# Patient Record
Sex: MALE
[De-identification: names, ages, dates, MRNs, and addresses within clinical notes are randomized per-mention and may not be internally consistent; named-entity substitution may affect disease eponyms.]

---

## 2017-02-23 PROCEDURE — 84154 ASSAY OF PSA FREE: CPT | Performed by: UROLOGY

## 2017-02-23 PROCEDURE — 36415 COLL VENOUS BLD VENIPUNCTURE: CPT | Performed by: UROLOGY

## 2017-02-23 PROCEDURE — 84153 ASSAY OF PSA TOTAL: CPT | Performed by: UROLOGY

## 2017-04-12 PROCEDURE — 87086 URINE CULTURE/COLONY COUNT: CPT | Performed by: UROLOGY

## 2017-04-17 PROCEDURE — 88344 IMHCHEM/IMCYTCHM EA MLT ANTB: CPT | Performed by: UROLOGY

## 2017-04-17 PROCEDURE — 88321 CONSLTJ&REPRT SLD PREP ELSWR: CPT | Performed by: UROLOGY

## 2017-04-17 PROCEDURE — 88305 TISSUE EXAM BY PATHOLOGIST: CPT | Performed by: UROLOGY

## 2018-02-21 PROBLEM — L28.0 LICHEN SIMPLEX CHRONICUS: Status: ACTIVE | Noted: 2018-02-21

## 2018-02-21 PROBLEM — L81.0 POSTINFLAMMATORY HYPERPIGMENTATION: Status: ACTIVE | Noted: 2018-02-21

## 2018-02-21 PROBLEM — L85.3 XEROSIS CUTIS: Status: ACTIVE | Noted: 2018-02-21

## 2018-04-17 PROCEDURE — 84154 ASSAY OF PSA FREE: CPT | Performed by: UROLOGY

## 2018-04-17 PROCEDURE — 36415 COLL VENOUS BLD VENIPUNCTURE: CPT | Performed by: UROLOGY

## 2018-04-17 PROCEDURE — 84153 ASSAY OF PSA TOTAL: CPT | Performed by: UROLOGY

## 2018-06-27 PROCEDURE — 87086 URINE CULTURE/COLONY COUNT: CPT | Performed by: INTERNAL MEDICINE

## 2018-06-27 PROCEDURE — 81001 URINALYSIS AUTO W/SCOPE: CPT | Performed by: INTERNAL MEDICINE

## 2018-08-20 PROBLEM — Z85.46 HISTORY OF PROSTATE CANCER: Status: ACTIVE | Noted: 2018-08-20

## 2018-09-05 PROBLEM — R30.0 DYSURIA: Status: ACTIVE | Noted: 2018-09-05

## 2018-09-25 ENCOUNTER — DIAGNOSTIC TRANS (OUTPATIENT)
Dept: OTHER | Age: 73
End: 2018-09-25

## 2018-09-25 ENCOUNTER — HOSPITAL (OUTPATIENT)
Dept: OTHER | Age: 73
End: 2018-09-25
Attending: EMERGENCY MEDICINE

## 2018-09-25 LAB
ALBUMIN SERPL-MCNC: 3.3 GM/DL (ref 3.6–5.1)
ALBUMIN/GLOB SERPL: 0.9 {RATIO} (ref 1–2.4)
ALP SERPL-CCNC: 62 UNIT/L (ref 45–117)
ALT SERPL-CCNC: 27 UNIT/L
ANALYZER ANC (IANC): ABNORMAL
ANION GAP SERPL CALC-SCNC: 12 MMOL/L (ref 10–20)
AST SERPL-CCNC: 19 UNIT/L
BASOPHILS # BLD: 0 THOUSAND/MCL (ref 0–0.3)
BASOPHILS NFR BLD: 0 %
BILIRUB SERPL-MCNC: 0.4 MG/DL (ref 0.2–1)
BUN SERPL-MCNC: 31 MG/DL (ref 6–20)
BUN/CREAT SERPL: 19 (ref 7–25)
CALCIUM SERPL-MCNC: 9.2 MG/DL (ref 8.4–10.2)
CHLORIDE: 105 MMOL/L (ref 98–107)
CO2 SERPL-SCNC: 28 MMOL/L (ref 21–32)
CREAT SERPL-MCNC: 1.63 MG/DL (ref 0.67–1.17)
DIFFERENTIAL METHOD BLD: ABNORMAL
EOSINOPHIL # BLD: 0.2 THOUSAND/MCL (ref 0.1–0.5)
EOSINOPHIL NFR BLD: 2 %
ERYTHROCYTE [DISTWIDTH] IN BLOOD: 12.9 % (ref 11–15)
GLOBULIN SER-MCNC: 3.5 GM/DL (ref 2–4)
GLUCOSE SERPL-MCNC: 119 MG/DL (ref 65–99)
HEMATOCRIT: 38.3 % (ref 39–51)
HGB BLD-MCNC: 12.8 GM/DL (ref 13–17)
LIPASE SERPL-CCNC: 157 UNIT/L (ref 73–393)
LYMPHOCYTES # BLD: 0.7 THOUSAND/MCL (ref 1–4)
LYMPHOCYTES NFR BLD: 10 %
MCH RBC QN AUTO: 30.8 PG (ref 26–34)
MCHC RBC AUTO-ENTMCNC: 33.4 GM/DL (ref 32–36.5)
MCV RBC AUTO: 92.3 FL (ref 78–100)
MONOCYTES # BLD: 0.5 THOUSAND/MCL (ref 0.3–0.9)
MONOCYTES NFR BLD: 7 %
NEUTROPHILS # BLD: 5.5 THOUSAND/MCL (ref 1.8–7.7)
NEUTROPHILS NFR BLD: 81 %
NEUTS SEG NFR BLD: ABNORMAL %
NRBC (NRBCRE): ABNORMAL
PLATELET # BLD: 244 THOUSAND/MCL (ref 140–450)
POTASSIUM SERPL-SCNC: 3.5 MMOL/L (ref 3.4–5.1)
PROT SERPL-MCNC: 6.8 GM/DL (ref 6.4–8.2)
RBC # BLD: 4.15 MILLION/MCL (ref 4.5–5.9)
SODIUM SERPL-SCNC: 141 MMOL/L (ref 135–145)
TROPONIN I SERPL HS-MCNC: <0.02 NG/ML
WBC # BLD: 6.8 THOUSAND/MCL (ref 4.2–11)

## 2018-10-01 PROCEDURE — 84154 ASSAY OF PSA FREE: CPT | Performed by: UROLOGY

## 2018-10-01 PROCEDURE — 84153 ASSAY OF PSA TOTAL: CPT | Performed by: UROLOGY

## 2018-10-04 PROBLEM — M75.42 IMPINGEMENT SYNDROME OF LEFT SHOULDER: Status: ACTIVE | Noted: 2018-10-04

## 2018-11-21 PROCEDURE — 82365 CALCULUS SPECTROSCOPY: CPT | Performed by: UROLOGY

## 2018-12-04 PROCEDURE — 83945 ASSAY OF OXALATE: CPT | Performed by: UROLOGY

## 2018-12-04 PROCEDURE — 82436 ASSAY OF URINE CHLORIDE: CPT | Performed by: UROLOGY

## 2018-12-04 PROCEDURE — 82507 ASSAY OF CITRATE: CPT | Performed by: UROLOGY

## 2018-12-04 PROCEDURE — 82340 ASSAY OF CALCIUM IN URINE: CPT | Performed by: UROLOGY

## 2018-12-04 PROCEDURE — 84392 ASSAY OF URINE SULFATE: CPT | Performed by: UROLOGY

## 2021-08-11 NOTE — H&P (VIEW-ONLY)
Celina Puckett is a 77year old male who presents for a pre-operative physical exam. Patient is to have surgery on his R facial gland, to be done by Dr. Ramya Joaquin at [facility] on 8/31.       HPI:   Pt complains of ongoing issues with his R parotid g LtM-gl7      Past Surgical History:   Procedure Laterality Date   • CHOLECYSTECTOMY     • COLONOSCOPY      05   • COLONOSCOPY      2015 repeat 10 years     • COLONOSCOPY,BIOPSY N/A 9/9/2015    Procedure: COLONOSCOPY, POSSIBLE BIOPSY, POSSIBLE POLYPECTOMY 4 tenderness  LUNGS: clear to auscultation  CARDIO: RRR without murmur  GI: good BS's,no masses, HSM or tenderness  : declined  RECTAL: declined  MUSCULOSKELETAL: back is not tender,FROM of the back  EXTREMITIES: no cyanosis, clubbing or edema  NEURO: Joann Amor

## 2021-08-25 RX ORDER — CEFAZOLIN SODIUM/WATER 2 G/20 ML
2 SYRINGE (ML) INTRAVENOUS ONCE
Status: CANCELLED | OUTPATIENT
Start: 2021-08-25 | End: 2021-08-25

## 2021-08-25 RX ORDER — ACETAMINOPHEN 500 MG
1000 TABLET ORAL ONCE
Status: CANCELLED | OUTPATIENT
Start: 2021-08-25 | End: 2021-08-25

## 2021-08-25 RX ORDER — DIPHENHYDRAMINE HCL 25 MG
25 TABLET ORAL NIGHTLY PRN
COMMUNITY

## 2021-08-26 ENCOUNTER — ANESTHESIA EVENT (OUTPATIENT)
Dept: SURGERY | Facility: HOSPITAL | Age: 76
End: 2021-08-26
Payer: COMMERCIAL

## 2021-08-28 ENCOUNTER — LAB ENCOUNTER (OUTPATIENT)
Dept: LAB | Age: 76
End: 2021-08-28
Attending: OTOLARYNGOLOGY
Payer: COMMERCIAL

## 2021-08-28 DIAGNOSIS — K11.5 PAROTID SIALOLITHIASIS: ICD-10-CM

## 2021-08-30 LAB — SARS-COV-2 RNA RESP QL NAA+PROBE: NOT DETECTED

## 2021-08-31 ENCOUNTER — HOSPITAL ENCOUNTER (OUTPATIENT)
Facility: HOSPITAL | Age: 76
Discharge: HOME | End: 2021-09-01
Attending: OTOLARYNGOLOGY | Admitting: OTOLARYNGOLOGY
Payer: COMMERCIAL

## 2021-08-31 ENCOUNTER — ANESTHESIA (OUTPATIENT)
Dept: SURGERY | Facility: HOSPITAL | Age: 76
End: 2021-08-31
Payer: COMMERCIAL

## 2021-08-31 DIAGNOSIS — K11.5 PAROTID SIALOLITHIASIS: Primary | ICD-10-CM

## 2021-08-31 PROCEDURE — 0C9800Z DRAINAGE OF RIGHT PAROTID GLAND WITH DRAINAGE DEVICE, OPEN APPROACH: ICD-10-PCS | Performed by: OTOLARYNGOLOGY

## 2021-08-31 RX ORDER — HYDROCODONE BITARTRATE AND ACETAMINOPHEN 5; 325 MG/1; MG/1
2 TABLET ORAL AS NEEDED
Status: COMPLETED | OUTPATIENT
Start: 2021-08-31 | End: 2021-08-31

## 2021-08-31 RX ORDER — HYDROCODONE BITARTRATE AND ACETAMINOPHEN 5; 325 MG/1; MG/1
2 TABLET ORAL EVERY 4 HOURS PRN
Status: DISCONTINUED | OUTPATIENT
Start: 2021-08-31 | End: 2021-09-01

## 2021-08-31 RX ORDER — AMLODIPINE AND OLMESARTAN MEDOXOMIL 10; 20 MG/1; MG/1
1 TABLET ORAL DAILY
COMMUNITY
End: 2021-11-11

## 2021-08-31 RX ORDER — HYDROCODONE BITARTRATE AND ACETAMINOPHEN 5; 325 MG/1; MG/1
1 TABLET ORAL AS NEEDED
Status: COMPLETED | OUTPATIENT
Start: 2021-08-31 | End: 2021-08-31

## 2021-08-31 RX ORDER — MEPERIDINE HYDROCHLORIDE 25 MG/ML
12.5 INJECTION INTRAMUSCULAR; INTRAVENOUS; SUBCUTANEOUS AS NEEDED
Status: DISCONTINUED | OUTPATIENT
Start: 2021-08-31 | End: 2021-08-31 | Stop reason: HOSPADM

## 2021-08-31 RX ORDER — AMLODIPINE AND OLMESARTAN MEDOXOMIL 10; 20 MG/1; MG/1
0.5 TABLET ORAL DAILY
Status: DISCONTINUED | OUTPATIENT
Start: 2021-09-01 | End: 2021-08-31 | Stop reason: SDUPTHER

## 2021-08-31 RX ORDER — AMLODIPINE BESYLATE 5 MG/1
5 TABLET ORAL DAILY
Status: DISCONTINUED | OUTPATIENT
Start: 2021-09-01 | End: 2021-09-01

## 2021-08-31 RX ORDER — DEXAMETHASONE SODIUM PHOSPHATE 4 MG/ML
VIAL (ML) INJECTION AS NEEDED
Status: DISCONTINUED | OUTPATIENT
Start: 2021-08-31 | End: 2021-08-31 | Stop reason: SURG

## 2021-08-31 RX ORDER — TAMSULOSIN HYDROCHLORIDE 0.4 MG/1
0.4 CAPSULE ORAL DAILY
COMMUNITY
End: 2021-11-11

## 2021-08-31 RX ORDER — LIDOCAINE HYDROCHLORIDE 10 MG/ML
INJECTION, SOLUTION EPIDURAL; INFILTRATION; INTRACAUDAL; PERINEURAL AS NEEDED
Status: DISCONTINUED | OUTPATIENT
Start: 2021-08-31 | End: 2021-08-31 | Stop reason: SURG

## 2021-08-31 RX ORDER — ONDANSETRON 2 MG/ML
INJECTION INTRAMUSCULAR; INTRAVENOUS AS NEEDED
Status: DISCONTINUED | OUTPATIENT
Start: 2021-08-31 | End: 2021-08-31 | Stop reason: SURG

## 2021-08-31 RX ORDER — ONDANSETRON 2 MG/ML
4 INJECTION INTRAMUSCULAR; INTRAVENOUS EVERY 6 HOURS PRN
Status: DISCONTINUED | OUTPATIENT
Start: 2021-08-31 | End: 2021-09-01

## 2021-08-31 RX ORDER — LIDOCAINE HYDROCHLORIDE AND EPINEPHRINE 10; 10 MG/ML; UG/ML
INJECTION, SOLUTION INFILTRATION; PERINEURAL AS NEEDED
Status: DISCONTINUED | OUTPATIENT
Start: 2021-08-31 | End: 2021-08-31 | Stop reason: HOSPADM

## 2021-08-31 RX ORDER — NALOXONE HYDROCHLORIDE 0.4 MG/ML
80 INJECTION, SOLUTION INTRAMUSCULAR; INTRAVENOUS; SUBCUTANEOUS AS NEEDED
Status: DISCONTINUED | OUTPATIENT
Start: 2021-08-31 | End: 2021-08-31 | Stop reason: HOSPADM

## 2021-08-31 RX ORDER — TAMSULOSIN HYDROCHLORIDE 0.4 MG/1
0.4 CAPSULE ORAL DAILY
Status: DISCONTINUED | OUTPATIENT
Start: 2021-09-01 | End: 2021-09-01

## 2021-08-31 RX ORDER — AMOXICILLIN AND CLAVULANATE POTASSIUM 875; 125 MG/1; MG/1
1 TABLET, FILM COATED ORAL 2 TIMES DAILY
Qty: 28 TABLET | Refills: 0 | Status: SHIPPED | OUTPATIENT
Start: 2021-08-31 | End: 2021-09-15

## 2021-08-31 RX ORDER — LOSARTAN POTASSIUM 25 MG/1
25 TABLET ORAL DAILY
Status: DISCONTINUED | OUTPATIENT
Start: 2021-09-01 | End: 2021-09-01

## 2021-08-31 RX ORDER — SODIUM CHLORIDE, SODIUM LACTATE, POTASSIUM CHLORIDE, CALCIUM CHLORIDE 600; 310; 30; 20 MG/100ML; MG/100ML; MG/100ML; MG/100ML
INJECTION, SOLUTION INTRAVENOUS CONTINUOUS
Status: DISCONTINUED | OUTPATIENT
Start: 2021-08-31 | End: 2021-09-01

## 2021-08-31 RX ORDER — PREDNISONE 10 MG/1
30 TABLET ORAL DAILY
Status: DISCONTINUED | OUTPATIENT
Start: 2021-08-31 | End: 2021-09-01

## 2021-08-31 RX ORDER — HYDROCODONE BITARTRATE AND ACETAMINOPHEN 5; 325 MG/1; MG/1
1 TABLET ORAL EVERY 4 HOURS PRN
Status: DISCONTINUED | OUTPATIENT
Start: 2021-08-31 | End: 2021-09-01

## 2021-08-31 RX ORDER — MIDAZOLAM HYDROCHLORIDE 1 MG/ML
1 INJECTION INTRAMUSCULAR; INTRAVENOUS EVERY 5 MIN PRN
Status: DISCONTINUED | OUTPATIENT
Start: 2021-08-31 | End: 2021-08-31 | Stop reason: HOSPADM

## 2021-08-31 RX ORDER — ONDANSETRON 2 MG/ML
4 INJECTION INTRAMUSCULAR; INTRAVENOUS AS NEEDED
Status: DISCONTINUED | OUTPATIENT
Start: 2021-08-31 | End: 2021-08-31 | Stop reason: HOSPADM

## 2021-08-31 RX ORDER — DIPHENHYDRAMINE HCL 25 MG
25 CAPSULE ORAL NIGHTLY PRN
Status: DISCONTINUED | OUTPATIENT
Start: 2021-08-31 | End: 2021-09-01

## 2021-08-31 RX ORDER — AMOXICILLIN AND CLAVULANATE POTASSIUM 875; 125 MG/1; MG/1
875 TABLET, FILM COATED ORAL EVERY 12 HOURS SCHEDULED
Status: DISCONTINUED | OUTPATIENT
Start: 2021-08-31 | End: 2021-09-01

## 2021-08-31 RX ORDER — CEFAZOLIN SODIUM 1 G/3ML
INJECTION, POWDER, FOR SOLUTION INTRAMUSCULAR; INTRAVENOUS AS NEEDED
Status: DISCONTINUED | OUTPATIENT
Start: 2021-08-31 | End: 2021-08-31 | Stop reason: SURG

## 2021-08-31 RX ORDER — HYDROMORPHONE HYDROCHLORIDE 1 MG/ML
INJECTION, SOLUTION INTRAMUSCULAR; INTRAVENOUS; SUBCUTANEOUS
Status: COMPLETED
Start: 2021-08-31 | End: 2021-08-31

## 2021-08-31 RX ORDER — METOCLOPRAMIDE HYDROCHLORIDE 5 MG/ML
10 INJECTION INTRAMUSCULAR; INTRAVENOUS AS NEEDED
Status: DISCONTINUED | OUTPATIENT
Start: 2021-08-31 | End: 2021-08-31 | Stop reason: HOSPADM

## 2021-08-31 RX ORDER — METOPROLOL SUCCINATE 100 MG/1
100 TABLET, EXTENDED RELEASE ORAL DAILY
Status: DISCONTINUED | OUTPATIENT
Start: 2021-09-01 | End: 2021-09-01

## 2021-08-31 RX ORDER — CLONIDINE 0.2 MG/24H
1 PATCH, EXTENDED RELEASE TRANSDERMAL WEEKLY
Status: DISCONTINUED | OUTPATIENT
Start: 2021-09-01 | End: 2021-09-01

## 2021-08-31 RX ORDER — PREDNISONE 10 MG/1
30 TABLET ORAL DAILY
Qty: 21 TABLET | Refills: 0 | Status: SHIPPED | OUTPATIENT
Start: 2021-08-31 | End: 2021-09-07

## 2021-08-31 RX ORDER — HYDROMORPHONE HYDROCHLORIDE 1 MG/ML
0.4 INJECTION, SOLUTION INTRAMUSCULAR; INTRAVENOUS; SUBCUTANEOUS EVERY 5 MIN PRN
Status: DISCONTINUED | OUTPATIENT
Start: 2021-08-31 | End: 2021-08-31 | Stop reason: HOSPADM

## 2021-08-31 RX ORDER — HYDROCODONE BITARTRATE AND ACETAMINOPHEN 5; 325 MG/1; MG/1
1-2 TABLET ORAL EVERY 4 HOURS PRN
Status: DISCONTINUED | OUTPATIENT
Start: 2021-08-31 | End: 2021-08-31 | Stop reason: SDUPTHER

## 2021-08-31 RX ADMIN — LIDOCAINE HYDROCHLORIDE 50 MG: 10 INJECTION, SOLUTION EPIDURAL; INFILTRATION; INTRACAUDAL; PERINEURAL at 12:18:00

## 2021-08-31 RX ADMIN — DEXAMETHASONE SODIUM PHOSPHATE 8 MG: 4 MG/ML VIAL (ML) INJECTION at 12:43:00

## 2021-08-31 RX ADMIN — SODIUM CHLORIDE, SODIUM LACTATE, POTASSIUM CHLORIDE, CALCIUM CHLORIDE: 600; 310; 30; 20 INJECTION, SOLUTION INTRAVENOUS at 12:14:00

## 2021-08-31 RX ADMIN — CEFAZOLIN SODIUM 2 G: 1 INJECTION, POWDER, FOR SOLUTION INTRAMUSCULAR; INTRAVENOUS at 12:28:00

## 2021-08-31 RX ADMIN — SODIUM CHLORIDE, SODIUM LACTATE, POTASSIUM CHLORIDE, CALCIUM CHLORIDE: 600; 310; 30; 20 INJECTION, SOLUTION INTRAVENOUS at 14:12:00

## 2021-08-31 RX ADMIN — ONDANSETRON 4 MG: 2 INJECTION INTRAMUSCULAR; INTRAVENOUS at 12:44:00

## 2021-08-31 NOTE — ANESTHESIA PROCEDURE NOTES
Airway  Date/Time: 8/31/2021 12:20 PM  Urgency: elective      General Information and Staff    Patient location during procedure: OR  Anesthesiologist: Claudeen Hunger, MD  Performed: anesthesiologist     Indications and Patient Condition  Indications fo

## 2021-08-31 NOTE — BRIEF OP NOTE
Pre-Operative Diagnosis: Parotid sialolithiasis [K11.5]     Post-Operative Diagnosis: Parotid sialolithiasis [K11.5]      Procedure Performed:   OPEN DRAINAGE OF RIGHT PAROTID ABCESS WITH NERVE MONITOR     Surgeon(s) and Role:     Gus Fuentes MD -

## 2021-08-31 NOTE — ANESTHESIA PREPROCEDURE EVALUATION
PRE-OP EVALUATION    Patient Name: Mal Soler    Admit Diagnosis: Parotid sialolithiasis [K11.5]    Pre-op Diagnosis: Parotid sialolithiasis [K11.5]    OPEN RIGHT PAROTID STONE REMOVAL POSSIBLE PAROTIDECTOMY WITH NERVE MONITOR     Anesthesia Procedu Tobacco comment: 1985    Alcohol use: Yes      Alcohol/week: 0.0 standard drinks      Comment: 3-4 on occas      Drug use: No     Available pre-op labs reviewed.   Lab Results   Component Value Date    WBC 9.10 08/05/2021    RBC 4.42 08/05/2021    HGB 13.4

## 2021-08-31 NOTE — OPERATIVE REPORT
[facility] [facility] Patient Status:  Outpatient in a Bed    1945 MRN [id number]   [facility] 3NW-A Attending No att. providers found   [facility] Day # 0 PCP Rebecca Oliveira MD       Operative Note    Patient Name: Sarah Almaraz patient. The patient was sterilely prepped and draped for parotid surgery. Facial nerve monitoring leads were placed prior to scrubbing. The incision was mapped out with a marking pen in the fashion of a modified josephine.  The incision was injected with 1% unnecessary risk for benign disease. The patient has wife had been explained this prior to surgery and knew that this was a distinct possibility.   Once I was satisfied that I had searched as safely as possible for the stone the wound was copiously irrigat

## 2021-08-31 NOTE — ANESTHESIA POSTPROCEDURE EVALUATION
[address] [facility] Patient Status:  Outpatient in a Bed   Age/Gender 77year old male MRN [id number]   [facility] SURGERY Attending Jhonny Bee MD   Hosp Day # 0 PCP Li Cameron MD       Anesthesia Post-op Note

## 2021-08-31 NOTE — INTERVAL H&P NOTE
Pre-op Diagnosis: Parotid sialolithiasis [K11.5]    The above referenced H&P was reviewed by Keshia Butterfield MD on 8/31/2021, the patient was examined and no significant changes have occurred in the patient's condition since the H&P was performed.   I disc

## 2021-09-01 VITALS
WEIGHT: 208.56 LBS | BODY MASS INDEX: 29.86 KG/M2 | TEMPERATURE: 99 F | RESPIRATION RATE: 18 BRPM | HEIGHT: 70 IN | OXYGEN SATURATION: 95 % | SYSTOLIC BLOOD PRESSURE: 170 MMHG | HEART RATE: 78 BPM | DIASTOLIC BLOOD PRESSURE: 83 MMHG

## 2021-09-01 LAB
ANION GAP SERPL CALC-SCNC: 7 MMOL/L (ref 0–18)
BASOPHILS # BLD AUTO: 0.01 X10(3) UL (ref 0–0.2)
BASOPHILS NFR BLD AUTO: 0.1 %
BUN BLD-MCNC: 28 MG/DL (ref 7–18)
CALCIUM BLD-MCNC: 9.3 MG/DL (ref 8.5–10.1)
CHLORIDE SERPL-SCNC: 103 MMOL/L (ref 98–112)
CO2 SERPL-SCNC: 27 MMOL/L (ref 21–32)
CREAT BLD-MCNC: 1.53 MG/DL
EOSINOPHIL # BLD AUTO: 0 X10(3) UL (ref 0–0.7)
EOSINOPHIL NFR BLD AUTO: 0 %
ERYTHROCYTE [DISTWIDTH] IN BLOOD BY AUTOMATED COUNT: 12.9 %
GLUCOSE BLD-MCNC: 156 MG/DL (ref 70–99)
HCT VFR BLD AUTO: 36 %
HGB BLD-MCNC: 12.1 G/DL
IMM GRANULOCYTES # BLD AUTO: 0.09 X10(3) UL (ref 0–1)
IMM GRANULOCYTES NFR BLD: 0.7 %
LYMPHOCYTES # BLD AUTO: 0.61 X10(3) UL (ref 1–4)
LYMPHOCYTES NFR BLD AUTO: 4.7 %
MCH RBC QN AUTO: 31.4 PG (ref 26–34)
MCHC RBC AUTO-ENTMCNC: 33.6 G/DL (ref 31–37)
MCV RBC AUTO: 93.5 FL
MONOCYTES # BLD AUTO: 0.46 X10(3) UL (ref 0.1–1)
MONOCYTES NFR BLD AUTO: 3.6 %
NEUTROPHILS # BLD AUTO: 11.76 X10 (3) UL (ref 1.5–7.7)
NEUTROPHILS # BLD AUTO: 11.76 X10(3) UL (ref 1.5–7.7)
NEUTROPHILS NFR BLD AUTO: 90.9 %
OSMOLALITY SERPL CALC.SUM OF ELEC: 293 MOSM/KG (ref 275–295)
PLATELET # BLD AUTO: 262 10(3)UL (ref 150–450)
POTASSIUM SERPL-SCNC: 4.7 MMOL/L (ref 3.5–5.1)
RBC # BLD AUTO: 3.85 X10(6)UL
SODIUM SERPL-SCNC: 137 MMOL/L (ref 136–145)
WBC # BLD AUTO: 12.9 X10(3) UL (ref 4–11)

## 2021-09-01 PROCEDURE — 85025 COMPLETE CBC W/AUTO DIFF WBC: CPT | Performed by: INTERNAL MEDICINE

## 2021-09-01 PROCEDURE — 80048 BASIC METABOLIC PNL TOTAL CA: CPT | Performed by: INTERNAL MEDICINE

## 2021-09-01 NOTE — PROGRESS NOTES
[facility] Hospitalist Progress Note                                                                   [address] [facility]  6/2/1945    SUBJECTIVE:  Pt seen and examined. Doing well, off O2.   Pain c Parotid sialolithiasis       77 yr old male with PMH sig for HTN who presents s/p open drainage of R parotid abscess.     # R parotid abscess  · S/p I+D  · Post op care per ENT   · abx and steroids per ENT recs     # Essential HTN  · stable  · Resume home

## 2021-09-01 NOTE — PLAN OF CARE
Assumed care of patient at 0000. Upon assessment pt appears comfortable, rates pain/discomfort at 2/10 & declines pain meds at this time. Right side of face continues to have noted swelling.  LAUREANO drain in right of neck draining serosanguinous drainage to bul injury  Description: INTERVENTIONS AS NEEDED:  - Assess patient for physical needs  - Identify cognitive and physical deficits and behaviors that affect risk of falls  - [place] fall precautions as indicated by assessment  - Educate patient/family on pat

## 2021-09-01 NOTE — PROGRESS NOTES
NURSING ADMISSION NOTE      Patient admitted via Cart  Oriented to room. Safety precautions initiated. Bed in low position. Call light in reach. received patient from same day surgery . Alert and orient x 4 , on o2 3l/nc , c pox , o2 sat 100% .  Epworth Brim

## 2021-09-01 NOTE — CONSULTS
General Medicine Consult      Reason for consult:  Post op medical management     Consulted by: Dr. Jaleesa Dorsey    PCP: Star Merino MD      History of Present Illness: Patient is a 77year old male with PMH sig for HTN who presents s/p open drainage of R 0  diphenhydrAMINE HCl 25 MG Oral Tab, Take 25 mg by mouth nightly as needed for Sleep., Disp: , Rfl:   Metoprolol Succinate  MG Oral Tablet 24 Hr, Take 1 tablet (100 mg total) by mouth daily. , Disp: 90 tablet, Rfl: 0  cloNIDine 0.2 MG/24HR Transderm nontender, nondistended, no organomegaly, bowel sounds present  MSK: Full range of motion in extremities, no clubbing, no cyanosis  Skin: no rashes or lesions  Neuro:  Grossly intact, no sensory deficits     Diagnostics:   CBC/Chem  No results for input(s) history and medications. Further recommendations pending patient's clinical course.   [facility] hospitalist to continue to follow patient while in house    Patient and/or patient's family given opportunity to ask questions and note understanding and agreeing

## 2021-09-01 NOTE — PROGRESS NOTES
The patient declined all the antihypertensive medications that were ordered for him this morning. He states that he will take his own antihypertensive medications when he gets home. He is tolerating clear liquids with no complaint of nausea.

## 2021-09-02 NOTE — PROGRESS NOTES
Patient discharged to home. The patient received his discharge instructions and LAUREANO Drain care teaching from Leander Valdez RN. The patient was taken down to the [employer] by wheelchair by Leander Valdez RN.  He will be driven home by his

## 2021-10-12 RX ORDER — CEFAZOLIN SODIUM/WATER 2 G/20 ML
2 SYRINGE (ML) INTRAVENOUS ONCE
Status: CANCELLED | OUTPATIENT
Start: 2021-10-12 | End: 2021-10-12

## 2021-10-12 RX ORDER — ACETAMINOPHEN 500 MG
1000 TABLET ORAL ONCE
Status: CANCELLED | OUTPATIENT
Start: 2021-10-12 | End: 2021-10-12

## 2021-10-16 ENCOUNTER — LAB ENCOUNTER (OUTPATIENT)
Dept: LAB | Age: 76
End: 2021-10-16
Attending: OTOLARYNGOLOGY
Payer: COMMERCIAL

## 2021-10-16 DIAGNOSIS — K11.5 SIALOLITHIASIS: ICD-10-CM

## 2021-10-18 ENCOUNTER — ANESTHESIA EVENT (OUTPATIENT)
Dept: SURGERY | Facility: HOSPITAL | Age: 76
End: 2021-10-18
Payer: COMMERCIAL

## 2021-10-19 ENCOUNTER — HOSPITAL ENCOUNTER (OUTPATIENT)
Facility: HOSPITAL | Age: 76
Discharge: HOME | End: 2021-10-20
Attending: OTOLARYNGOLOGY | Admitting: OTOLARYNGOLOGY
Payer: COMMERCIAL

## 2021-10-19 ENCOUNTER — APPOINTMENT (OUTPATIENT)
Dept: GENERAL RADIOLOGY | Facility: HOSPITAL | Age: 76
End: 2021-10-19
Attending: OTOLARYNGOLOGY
Payer: COMMERCIAL

## 2021-10-19 ENCOUNTER — ANESTHESIA (OUTPATIENT)
Dept: SURGERY | Facility: HOSPITAL | Age: 76
End: 2021-10-19
Payer: COMMERCIAL

## 2021-10-19 DIAGNOSIS — K11.23 CHRONIC PAROTITIS: ICD-10-CM

## 2021-10-19 DIAGNOSIS — K11.3 ABSCESS OF SALIVARY GLAND: ICD-10-CM

## 2021-10-19 DIAGNOSIS — K11.5 SIALOLITHIASIS: Primary | ICD-10-CM

## 2021-10-19 PROCEDURE — 4A11X4G MONITORING OF PERIPHERAL NERVOUS ELECTRICAL ACTIVITY, INTRAOPERATIVE, EXTERNAL APPROACH: ICD-10-PCS | Performed by: OTOLARYNGOLOGY

## 2021-10-19 PROCEDURE — 88300 SURGICAL PATH GROSS: CPT | Performed by: OTOLARYNGOLOGY

## 2021-10-19 PROCEDURE — 88307 TISSUE EXAM BY PATHOLOGIST: CPT | Performed by: OTOLARYNGOLOGY

## 2021-10-19 PROCEDURE — 0CT80ZZ RESECTION OF RIGHT PAROTID GLAND, OPEN APPROACH: ICD-10-PCS | Performed by: OTOLARYNGOLOGY

## 2021-10-19 PROCEDURE — 00BM0ZZ EXCISION OF FACIAL NERVE, OPEN APPROACH: ICD-10-PCS | Performed by: OTOLARYNGOLOGY

## 2021-10-19 RX ORDER — AMOXICILLIN AND CLAVULANATE POTASSIUM 875; 125 MG/1; MG/1
1 TABLET, FILM COATED ORAL 2 TIMES DAILY
Qty: 28 TABLET | Refills: 0 | Status: SHIPPED | OUTPATIENT
Start: 2021-10-19 | End: 2021-11-02

## 2021-10-19 RX ORDER — HYDROMORPHONE HYDROCHLORIDE 1 MG/ML
INJECTION, SOLUTION INTRAMUSCULAR; INTRAVENOUS; SUBCUTANEOUS
Status: COMPLETED
Start: 2021-10-19 | End: 2021-10-19

## 2021-10-19 RX ORDER — ONDANSETRON 2 MG/ML
4 INJECTION INTRAMUSCULAR; INTRAVENOUS EVERY 6 HOURS PRN
Status: DISCONTINUED | OUTPATIENT
Start: 2021-10-19 | End: 2021-10-20

## 2021-10-19 RX ORDER — LOSARTAN POTASSIUM 25 MG/1
50 TABLET ORAL DAILY
Status: DISCONTINUED | OUTPATIENT
Start: 2021-10-19 | End: 2021-10-20

## 2021-10-19 RX ORDER — HYDROCODONE BITARTRATE AND ACETAMINOPHEN 5; 325 MG/1; MG/1
1 TABLET ORAL AS NEEDED
Status: DISCONTINUED | OUTPATIENT
Start: 2021-10-19 | End: 2021-10-19 | Stop reason: HOSPADM

## 2021-10-19 RX ORDER — ONDANSETRON 2 MG/ML
4 INJECTION INTRAMUSCULAR; INTRAVENOUS AS NEEDED
Status: DISCONTINUED | OUTPATIENT
Start: 2021-10-19 | End: 2021-10-19 | Stop reason: HOSPADM

## 2021-10-19 RX ORDER — HYDROCODONE BITARTRATE AND ACETAMINOPHEN 5; 325 MG/1; MG/1
1-2 TABLET ORAL EVERY 4 HOURS PRN
Qty: 30 TABLET | Refills: 0 | Status: SHIPPED | OUTPATIENT
Start: 2021-10-19

## 2021-10-19 RX ORDER — AMLODIPINE BESYLATE 5 MG/1
10 TABLET ORAL DAILY
Status: DISCONTINUED | OUTPATIENT
Start: 2021-10-19 | End: 2021-10-20

## 2021-10-19 RX ORDER — TAMSULOSIN HYDROCHLORIDE 0.4 MG/1
0.4 CAPSULE ORAL DAILY
Status: DISCONTINUED | OUTPATIENT
Start: 2021-10-19 | End: 2021-10-20

## 2021-10-19 RX ORDER — LIDOCAINE HYDROCHLORIDE AND EPINEPHRINE 10; 10 MG/ML; UG/ML
INJECTION, SOLUTION INFILTRATION; PERINEURAL AS NEEDED
Status: DISCONTINUED | OUTPATIENT
Start: 2021-10-19 | End: 2021-10-19 | Stop reason: HOSPADM

## 2021-10-19 RX ORDER — HYDROMORPHONE HYDROCHLORIDE 1 MG/ML
0.4 INJECTION, SOLUTION INTRAMUSCULAR; INTRAVENOUS; SUBCUTANEOUS EVERY 2 HOUR PRN
Status: DISCONTINUED | OUTPATIENT
Start: 2021-10-19 | End: 2021-10-20

## 2021-10-19 RX ORDER — SODIUM CHLORIDE AND POTASSIUM CHLORIDE .9; .15 G/100ML; G/100ML
SOLUTION INTRAVENOUS CONTINUOUS
Status: DISCONTINUED | OUTPATIENT
Start: 2021-10-19 | End: 2021-10-20

## 2021-10-19 RX ORDER — METOCLOPRAMIDE HYDROCHLORIDE 5 MG/ML
INJECTION INTRAMUSCULAR; INTRAVENOUS AS NEEDED
Status: DISCONTINUED | OUTPATIENT
Start: 2021-10-19 | End: 2021-10-19 | Stop reason: SURG

## 2021-10-19 RX ORDER — OXYCODONE HYDROCHLORIDE 5 MG/1
5 TABLET ORAL EVERY 4 HOURS PRN
Status: DISCONTINUED | OUTPATIENT
Start: 2021-10-19 | End: 2021-10-20

## 2021-10-19 RX ORDER — SODIUM CHLORIDE, SODIUM LACTATE, POTASSIUM CHLORIDE, CALCIUM CHLORIDE 600; 310; 30; 20 MG/100ML; MG/100ML; MG/100ML; MG/100ML
INJECTION, SOLUTION INTRAVENOUS CONTINUOUS
Status: DISCONTINUED | OUTPATIENT
Start: 2021-10-19 | End: 2021-10-19

## 2021-10-19 RX ORDER — SODIUM CHLORIDE AND POTASSIUM CHLORIDE .9; .15 G/100ML; G/100ML
SOLUTION INTRAVENOUS
Status: COMPLETED
Start: 2021-10-19 | End: 2021-10-19

## 2021-10-19 RX ORDER — DEXAMETHASONE SODIUM PHOSPHATE 4 MG/ML
VIAL (ML) INJECTION AS NEEDED
Status: DISCONTINUED | OUTPATIENT
Start: 2021-10-19 | End: 2021-10-19 | Stop reason: SURG

## 2021-10-19 RX ORDER — METOPROLOL SUCCINATE 50 MG/1
100 TABLET, EXTENDED RELEASE ORAL
Status: DISCONTINUED | OUTPATIENT
Start: 2021-10-19 | End: 2021-10-20

## 2021-10-19 RX ORDER — DIPHENHYDRAMINE HCL 25 MG
25 CAPSULE ORAL NIGHTLY PRN
Status: DISCONTINUED | OUTPATIENT
Start: 2021-10-19 | End: 2021-10-20

## 2021-10-19 RX ORDER — LIDOCAINE HYDROCHLORIDE 10 MG/ML
INJECTION, SOLUTION EPIDURAL; INFILTRATION; INTRACAUDAL; PERINEURAL AS NEEDED
Status: DISCONTINUED | OUTPATIENT
Start: 2021-10-19 | End: 2021-10-19 | Stop reason: SURG

## 2021-10-19 RX ORDER — ACETAMINOPHEN 325 MG/1
650 TABLET ORAL EVERY 4 HOURS PRN
Status: DISCONTINUED | OUTPATIENT
Start: 2021-10-19 | End: 2021-10-20

## 2021-10-19 RX ORDER — MEPERIDINE HYDROCHLORIDE 25 MG/ML
12.5 INJECTION INTRAMUSCULAR; INTRAVENOUS; SUBCUTANEOUS AS NEEDED
Status: DISCONTINUED | OUTPATIENT
Start: 2021-10-19 | End: 2021-10-19 | Stop reason: HOSPADM

## 2021-10-19 RX ORDER — HYDROMORPHONE HYDROCHLORIDE 1 MG/ML
0.2 INJECTION, SOLUTION INTRAMUSCULAR; INTRAVENOUS; SUBCUTANEOUS EVERY 2 HOUR PRN
Status: DISCONTINUED | OUTPATIENT
Start: 2021-10-19 | End: 2021-10-20

## 2021-10-19 RX ORDER — CLONIDINE 0.2 MG/24H
1 PATCH, EXTENDED RELEASE TRANSDERMAL WEEKLY
Status: DISCONTINUED | OUTPATIENT
Start: 2021-10-19 | End: 2021-10-20

## 2021-10-19 RX ORDER — NALOXONE HYDROCHLORIDE 0.4 MG/ML
80 INJECTION, SOLUTION INTRAMUSCULAR; INTRAVENOUS; SUBCUTANEOUS AS NEEDED
Status: DISCONTINUED | OUTPATIENT
Start: 2021-10-19 | End: 2021-10-19 | Stop reason: HOSPADM

## 2021-10-19 RX ORDER — ONDANSETRON 2 MG/ML
INJECTION INTRAMUSCULAR; INTRAVENOUS AS NEEDED
Status: DISCONTINUED | OUTPATIENT
Start: 2021-10-19 | End: 2021-10-19 | Stop reason: SURG

## 2021-10-19 RX ORDER — EPHEDRINE SULFATE 50 MG/ML
INJECTION INTRAVENOUS AS NEEDED
Status: DISCONTINUED | OUTPATIENT
Start: 2021-10-19 | End: 2021-10-19 | Stop reason: SURG

## 2021-10-19 RX ORDER — ROCURONIUM BROMIDE 10 MG/ML
INJECTION, SOLUTION INTRAVENOUS AS NEEDED
Status: DISCONTINUED | OUTPATIENT
Start: 2021-10-19 | End: 2021-10-19 | Stop reason: SURG

## 2021-10-19 RX ORDER — HYDROMORPHONE HYDROCHLORIDE 1 MG/ML
0.4 INJECTION, SOLUTION INTRAMUSCULAR; INTRAVENOUS; SUBCUTANEOUS EVERY 5 MIN PRN
Status: DISCONTINUED | OUTPATIENT
Start: 2021-10-19 | End: 2021-10-19 | Stop reason: HOSPADM

## 2021-10-19 RX ORDER — SODIUM CHLORIDE, SODIUM LACTATE, POTASSIUM CHLORIDE, CALCIUM CHLORIDE 600; 310; 30; 20 MG/100ML; MG/100ML; MG/100ML; MG/100ML
INJECTION, SOLUTION INTRAVENOUS CONTINUOUS
Status: DISCONTINUED | OUTPATIENT
Start: 2021-10-19 | End: 2021-10-19 | Stop reason: HOSPADM

## 2021-10-19 RX ORDER — HYDROCODONE BITARTRATE AND ACETAMINOPHEN 5; 325 MG/1; MG/1
2 TABLET ORAL AS NEEDED
Status: DISCONTINUED | OUTPATIENT
Start: 2021-10-19 | End: 2021-10-19 | Stop reason: HOSPADM

## 2021-10-19 RX ORDER — OXYCODONE HYDROCHLORIDE 5 MG/1
2.5 TABLET ORAL EVERY 4 HOURS PRN
Status: DISCONTINUED | OUTPATIENT
Start: 2021-10-19 | End: 2021-10-20

## 2021-10-19 RX ORDER — AMLODIPINE AND OLMESARTAN MEDOXOMIL 10; 20 MG/1; MG/1
1 TABLET ORAL DAILY
Status: DISCONTINUED | OUTPATIENT
Start: 2021-10-19 | End: 2021-10-19

## 2021-10-19 RX ORDER — PHENYLEPHRINE HCL 10 MG/ML
VIAL (ML) INJECTION AS NEEDED
Status: DISCONTINUED | OUTPATIENT
Start: 2021-10-19 | End: 2021-10-19 | Stop reason: SURG

## 2021-10-19 RX ORDER — MIDAZOLAM HYDROCHLORIDE 1 MG/ML
INJECTION INTRAMUSCULAR; INTRAVENOUS AS NEEDED
Status: DISCONTINUED | OUTPATIENT
Start: 2021-10-19 | End: 2021-10-19 | Stop reason: SURG

## 2021-10-19 RX ORDER — CEFAZOLIN SODIUM/WATER 2 G/20 ML
SYRINGE (ML) INTRAVENOUS AS NEEDED
Status: DISCONTINUED | OUTPATIENT
Start: 2021-10-19 | End: 2021-10-19 | Stop reason: SURG

## 2021-10-19 RX ADMIN — METOCLOPRAMIDE HYDROCHLORIDE 10 MG: 5 INJECTION INTRAMUSCULAR; INTRAVENOUS at 07:08:00

## 2021-10-19 RX ADMIN — EPHEDRINE SULFATE 10 MG: 50 INJECTION INTRAVENOUS at 07:31:00

## 2021-10-19 RX ADMIN — SODIUM CHLORIDE, SODIUM LACTATE, POTASSIUM CHLORIDE, CALCIUM CHLORIDE: 600; 310; 30; 20 INJECTION, SOLUTION INTRAVENOUS at 07:49:00

## 2021-10-19 RX ADMIN — PHENYLEPHRINE HCL 100 MCG: 10 MG/ML VIAL (ML) INJECTION at 08:30:00

## 2021-10-19 RX ADMIN — SODIUM CHLORIDE, SODIUM LACTATE, POTASSIUM CHLORIDE, CALCIUM CHLORIDE: 600; 310; 30; 20 INJECTION, SOLUTION INTRAVENOUS at 07:23:00

## 2021-10-19 RX ADMIN — DEXAMETHASONE SODIUM PHOSPHATE 8 MG: 4 MG/ML VIAL (ML) INJECTION at 07:08:00

## 2021-10-19 RX ADMIN — EPHEDRINE SULFATE 15 MG: 50 INJECTION INTRAVENOUS at 07:33:00

## 2021-10-19 RX ADMIN — MIDAZOLAM HYDROCHLORIDE 2 MG: 1 INJECTION INTRAMUSCULAR; INTRAVENOUS at 07:04:00

## 2021-10-19 RX ADMIN — LIDOCAINE HYDROCHLORIDE 50 MG: 10 INJECTION, SOLUTION EPIDURAL; INFILTRATION; INTRACAUDAL; PERINEURAL at 07:08:00

## 2021-10-19 RX ADMIN — PHENYLEPHRINE HCL 100 MCG: 10 MG/ML VIAL (ML) INJECTION at 08:18:00

## 2021-10-19 RX ADMIN — SODIUM CHLORIDE, SODIUM LACTATE, POTASSIUM CHLORIDE, CALCIUM CHLORIDE: 600; 310; 30; 20 INJECTION, SOLUTION INTRAVENOUS at 08:48:00

## 2021-10-19 RX ADMIN — SODIUM CHLORIDE, SODIUM LACTATE, POTASSIUM CHLORIDE, CALCIUM CHLORIDE: 600; 310; 30; 20 INJECTION, SOLUTION INTRAVENOUS at 08:04:00

## 2021-10-19 RX ADMIN — ROCURONIUM BROMIDE 5 MG: 10 INJECTION, SOLUTION INTRAVENOUS at 07:08:00

## 2021-10-19 RX ADMIN — PHENYLEPHRINE HCL 50 MCG: 10 MG/ML VIAL (ML) INJECTION at 08:17:00

## 2021-10-19 RX ADMIN — ONDANSETRON 4 MG: 2 INJECTION INTRAMUSCULAR; INTRAVENOUS at 07:08:00

## 2021-10-19 RX ADMIN — CEFAZOLIN SODIUM/WATER 2 G: 2 G/20 ML SYRINGE (ML) INTRAVENOUS at 07:10:00

## 2021-10-19 RX ADMIN — PHENYLEPHRINE HCL 100 MCG: 10 MG/ML VIAL (ML) INJECTION at 07:35:00

## 2021-10-19 RX ADMIN — SODIUM CHLORIDE, SODIUM LACTATE, POTASSIUM CHLORIDE, CALCIUM CHLORIDE: 600; 310; 30; 20 INJECTION, SOLUTION INTRAVENOUS at 06:56:00

## 2021-10-19 RX ADMIN — PHENYLEPHRINE HCL 100 MCG: 10 MG/ML VIAL (ML) INJECTION at 08:48:00

## 2021-10-19 NOTE — H&P
History & Physical Examination    Name: Mari Cosby  Patient ID:  [id number]  Date:  10/19/2021  YOB: 1945 AGE:  77year old SEX:  male      M.D./D.O./D.P.M PERFORMING SURGERY/PROCEDURE:    Jessica Oscar MD    Diagnosis:  Sialolith alternatives with the patient/family. They understand and agree to proceed with the plan of care. Yes    Interval Note:    I have reviewed the History and Physical done within the last 30 days. Any changes are noted above.      Ashlee Duque MD 10/19/2

## 2021-10-19 NOTE — ANESTHESIA PREPROCEDURE EVALUATION
PRE-OP EVALUATION    Patient Name: Francheska Fabian    Admit Diagnosis: Sialolithiasis [K11.5]  Abscess of salivary gland [K11.3]  Chronic parotitis [K11.23]    Pre-op Diagnosis: Sialolithiasis [K11.5]  Abscess of salivary gland [K11.3]  Chronic parotitis Rfl: , 10/5/2021        Allergies: Patient has no known allergies. Anesthesia Evaluation    Patient summary reviewed.     Anesthetic Complications  (-) history of anesthetic complications         GI/Hepatic/Renal                                 Cardiov 09/01/2021     08/05/2021    K 4.7 09/01/2021    K 4.97 08/05/2021     09/01/2021     08/05/2021    CO2 27.0 09/01/2021    CO2 28.6 08/05/2021    BUN 28 (H) 09/01/2021    BUN 33.0 (H) 08/05/2021    CREATSERUM 1.53 (H) 09/01/2021    CREATS

## 2021-10-19 NOTE — BRIEF OP NOTE
Pre-Operative Diagnosis: Sialolithiasis [K11.5]  Abscess of salivary gland [K11.3]  Chronic parotitis [K11.23]     Post-Operative Diagnosis: Sialolithiasis [K11. 5]Abscess of salivary gland [K11. 3]Chronic parotitis [K11.23]      Procedure Performed:    Tracie

## 2021-10-19 NOTE — ANESTHESIA PROCEDURE NOTES
Airway  Date/Time: 10/19/2021 7:09 AM  Urgency: Elective    Airway not difficult    General Information and Staff    Patient location during procedure: OR  Anesthesiologist: Taiwo Nunez MD  Resident/CRNA: Janay Lara CRNA  Performed: TARIK

## 2021-10-19 NOTE — ANESTHESIA POSTPROCEDURE EVALUATION
[address] [place] [address] Patient Status:  Outpatient in a Bed   Age/Gender 77year old male MRN [id number]   [facility] SURGERY Attending Alexandra Sotelo MD   Hosp Day # 0 PCP Yasmin Ariza MD       Anesthesia Post-op Note

## 2021-10-19 NOTE — OPERATIVE REPORT
[address] [place] [address] Patient Status:  Outpatient in a Bed    1945 MRN [id number]   [facility] 0SW-A Attending Ines [facility] Day # 0 PCP Star Merino MD       Operative Note    Patient Name: Lala Angelucci    Pre carried out with a Birdie and the facial nerve main trunk was identified. The facial nerve monitor confirmed its identity and the nerve was careful traced out to the pes anserinus. The lower division was identified and followed. The lower division was trace

## 2021-10-20 VITALS
BODY MASS INDEX: 32.33 KG/M2 | WEIGHT: 218.25 LBS | OXYGEN SATURATION: 97 % | HEART RATE: 51 BPM | HEIGHT: 69 IN | DIASTOLIC BLOOD PRESSURE: 63 MMHG | TEMPERATURE: 98 F | SYSTOLIC BLOOD PRESSURE: 118 MMHG | RESPIRATION RATE: 18 BRPM

## 2021-10-20 NOTE — DISCHARGE SUMMARY
Outpatient Surgery Brief Discharge Summary         Patient ID:  Yon Herrera  [id number]  77year old  6/2/1945    Discharge Diagnoses: Sialolithiasis [K11. 5]Abscess of salivary gland [K11. 3]Chronic parotitis [K11.23]    Procedures: right total parotid

## 2021-10-20 NOTE — PROGRESS NOTES
Assumed care of patient at approx 1930 following RN shift report. Pt alert and oriented, pleasant throughout shift. Vital signs stable, pt has no c/o pain. Pt has LAUREANO drain with 40cc of bloody fluid out overnight.  Pt up independently to the bathroom, tolera

## 2021-11-11 PROBLEM — M75.42 IMPINGEMENT SYNDROME OF LEFT SHOULDER: Status: RESOLVED | Noted: 2018-10-04 | Resolved: 2021-11-11

## 2022-01-06 PROBLEM — N18.30 STAGE 3 CHRONIC KIDNEY DISEASE, UNSPECIFIED WHETHER STAGE 3A OR 3B CKD (HCC): Status: ACTIVE | Noted: 2022-01-06

## 2022-01-06 PROBLEM — C61 MALIGNANT NEOPLASM OF PROSTATE (HCC): Status: ACTIVE | Noted: 2022-01-06

## 2023-09-11 RX ORDER — OLMESARTAN MEDOXOMIL 20 MG/1
20 TABLET ORAL DAILY
COMMUNITY

## 2023-09-11 RX ORDER — AMLODIPINE BESYLATE 10 MG/1
10 TABLET ORAL DAILY
COMMUNITY

## 2023-09-12 ENCOUNTER — LAB ENCOUNTER (OUTPATIENT)
Dept: LAB | Facility: HOSPITAL | Age: 78
End: 2023-09-12
Attending: STUDENT IN AN ORGANIZED HEALTH CARE EDUCATION/TRAINING PROGRAM
Payer: MEDICARE

## 2023-09-12 DIAGNOSIS — Z01.818 PREOP TESTING: ICD-10-CM

## 2023-09-12 LAB
ANTIBODY SCREEN: NEGATIVE
RH BLOOD TYPE: POSITIVE
RH BLOOD TYPE: POSITIVE

## 2023-09-12 PROCEDURE — 36415 COLL VENOUS BLD VENIPUNCTURE: CPT

## 2023-09-12 PROCEDURE — 86850 RBC ANTIBODY SCREEN: CPT

## 2023-09-12 PROCEDURE — 86900 BLOOD TYPING SEROLOGIC ABO: CPT

## 2023-09-12 PROCEDURE — 86901 BLOOD TYPING SEROLOGIC RH(D): CPT

## 2023-09-15 ENCOUNTER — APPOINTMENT (OUTPATIENT)
Dept: GENERAL RADIOLOGY | Facility: HOSPITAL | Age: 78
End: 2023-09-15
Attending: STUDENT IN AN ORGANIZED HEALTH CARE EDUCATION/TRAINING PROGRAM
Payer: MEDICARE

## 2023-09-15 ENCOUNTER — HOSPITAL ENCOUNTER (OUTPATIENT)
Facility: HOSPITAL | Age: 78
Setting detail: HOSPITAL OUTPATIENT SURGERY
Discharge: HOME | End: 2023-09-15
Attending: STUDENT IN AN ORGANIZED HEALTH CARE EDUCATION/TRAINING PROGRAM | Admitting: STUDENT IN AN ORGANIZED HEALTH CARE EDUCATION/TRAINING PROGRAM
Payer: MEDICARE

## 2023-09-15 ENCOUNTER — ANESTHESIA (OUTPATIENT)
Dept: SURGERY | Facility: HOSPITAL | Age: 78
End: 2023-09-15
Payer: MEDICARE

## 2023-09-15 ENCOUNTER — ANESTHESIA EVENT (OUTPATIENT)
Dept: SURGERY | Facility: HOSPITAL | Age: 78
End: 2023-09-15
Payer: MEDICARE

## 2023-09-15 VITALS
RESPIRATION RATE: 12 BRPM | WEIGHT: 196 LBS | HEIGHT: 70 IN | BODY MASS INDEX: 28.06 KG/M2 | TEMPERATURE: 98 F | SYSTOLIC BLOOD PRESSURE: 158 MMHG | DIASTOLIC BLOOD PRESSURE: 73 MMHG | HEART RATE: 49 BPM | OXYGEN SATURATION: 99 %

## 2023-09-15 DIAGNOSIS — Z01.818 PREOP TESTING: Primary | ICD-10-CM

## 2023-09-15 DIAGNOSIS — Z09 POSTOP CHECK: ICD-10-CM

## 2023-09-15 PROCEDURE — 88311 DECALCIFY TISSUE: CPT | Performed by: STUDENT IN AN ORGANIZED HEALTH CARE EDUCATION/TRAINING PROGRAM

## 2023-09-15 PROCEDURE — 73560 X-RAY EXAM OF KNEE 1 OR 2: CPT | Performed by: STUDENT IN AN ORGANIZED HEALTH CARE EDUCATION/TRAINING PROGRAM

## 2023-09-15 PROCEDURE — 97535 SELF CARE MNGMENT TRAINING: CPT

## 2023-09-15 PROCEDURE — 0SRC0M9 REPLACEMENT OF RIGHT KNEE JOINT WITH LATERAL UNICONDYLAR SYNTHETIC SUBSTITUTE, CEMENTED, OPEN APPROACH: ICD-10-PCS | Performed by: STUDENT IN AN ORGANIZED HEALTH CARE EDUCATION/TRAINING PROGRAM

## 2023-09-15 PROCEDURE — 97530 THERAPEUTIC ACTIVITIES: CPT

## 2023-09-15 PROCEDURE — 88305 TISSUE EXAM BY PATHOLOGIST: CPT | Performed by: STUDENT IN AN ORGANIZED HEALTH CARE EDUCATION/TRAINING PROGRAM

## 2023-09-15 PROCEDURE — 97165 OT EVAL LOW COMPLEX 30 MIN: CPT

## 2023-09-15 PROCEDURE — 97161 PT EVAL LOW COMPLEX 20 MIN: CPT

## 2023-09-15 PROCEDURE — 64447 NJX AA&/STRD FEMORAL NRV IMG: CPT | Performed by: STUDENT IN AN ORGANIZED HEALTH CARE EDUCATION/TRAINING PROGRAM

## 2023-09-15 DEVICE — IMPLANTABLE DEVICE
Type: IMPLANTABLE DEVICE | Site: KNEE | Status: FUNCTIONAL
Brand: PERSONA® PARTIAL KNEE SYSTEM

## 2023-09-15 DEVICE — IMPLANTABLE DEVICE
Type: IMPLANTABLE DEVICE | Site: KNEE | Status: FUNCTIONAL
Brand: BIOMET® BONE CEMENT R

## 2023-09-15 RX ORDER — ACETAMINOPHEN 500 MG
1000 TABLET ORAL ONCE
Status: COMPLETED | OUTPATIENT
Start: 2023-09-15 | End: 2023-09-15

## 2023-09-15 RX ORDER — LIDOCAINE HYDROCHLORIDE 10 MG/ML
INJECTION, SOLUTION INFILTRATION; PERINEURAL
Status: COMPLETED | OUTPATIENT
Start: 2023-09-15 | End: 2023-09-15

## 2023-09-15 RX ORDER — GLYCOPYRROLATE 0.2 MG/ML
INJECTION, SOLUTION INTRAMUSCULAR; INTRAVENOUS AS NEEDED
Status: DISCONTINUED | OUTPATIENT
Start: 2023-09-15 | End: 2023-09-15 | Stop reason: SURG

## 2023-09-15 RX ORDER — ONDANSETRON 2 MG/ML
INJECTION INTRAMUSCULAR; INTRAVENOUS AS NEEDED
Status: DISCONTINUED | OUTPATIENT
Start: 2023-09-15 | End: 2023-09-15 | Stop reason: SURG

## 2023-09-15 RX ORDER — LIDOCAINE HYDROCHLORIDE 10 MG/ML
INJECTION, SOLUTION EPIDURAL; INFILTRATION; INTRACAUDAL; PERINEURAL AS NEEDED
Status: DISCONTINUED | OUTPATIENT
Start: 2023-09-15 | End: 2023-09-15 | Stop reason: SURG

## 2023-09-15 RX ORDER — OXYCODONE HYDROCHLORIDE 5 MG/1
10 TABLET ORAL EVERY 4 HOURS PRN
Status: DISCONTINUED | OUTPATIENT
Start: 2023-09-15 | End: 2023-09-15

## 2023-09-15 RX ORDER — CEFAZOLIN SODIUM/WATER 2 G/20 ML
SYRINGE (ML) INTRAVENOUS AS NEEDED
Status: DISCONTINUED | OUTPATIENT
Start: 2023-09-15 | End: 2023-09-15 | Stop reason: SURG

## 2023-09-15 RX ORDER — NALOXONE HYDROCHLORIDE 0.4 MG/ML
80 INJECTION, SOLUTION INTRAMUSCULAR; INTRAVENOUS; SUBCUTANEOUS AS NEEDED
Status: DISCONTINUED | OUTPATIENT
Start: 2023-09-15 | End: 2023-09-15

## 2023-09-15 RX ORDER — MIDAZOLAM HYDROCHLORIDE 1 MG/ML
INJECTION INTRAMUSCULAR; INTRAVENOUS
Status: COMPLETED | OUTPATIENT
Start: 2023-09-15 | End: 2023-09-15

## 2023-09-15 RX ORDER — ROSUVASTATIN CALCIUM 5 MG/1
5 TABLET, COATED ORAL NIGHTLY
COMMUNITY
Start: 2022-11-03

## 2023-09-15 RX ORDER — MORPHINE SULFATE 4 MG/ML
6 INJECTION, SOLUTION INTRAMUSCULAR; INTRAVENOUS EVERY 2 HOUR PRN
OUTPATIENT
Start: 2023-09-15 | End: 2023-09-16

## 2023-09-15 RX ORDER — TRANEXAMIC ACID 10 MG/ML
INJECTION, SOLUTION INTRAVENOUS AS NEEDED
Status: DISCONTINUED | OUTPATIENT
Start: 2023-09-15 | End: 2023-09-15 | Stop reason: SURG

## 2023-09-15 RX ORDER — MORPHINE SULFATE 15 MG/1
15 TABLET ORAL EVERY 4 HOURS PRN
Status: DISCONTINUED | OUTPATIENT
Start: 2023-09-15 | End: 2023-09-15

## 2023-09-15 RX ORDER — ACETAMINOPHEN 500 MG
1000 TABLET ORAL ONCE AS NEEDED
Status: DISCONTINUED | OUTPATIENT
Start: 2023-09-15 | End: 2023-09-15

## 2023-09-15 RX ORDER — DIPHENHYDRAMINE HCL 25 MG
25 CAPSULE ORAL EVERY 6 HOURS PRN
COMMUNITY

## 2023-09-15 RX ORDER — MORPHINE SULFATE 10 MG/ML
6 INJECTION, SOLUTION INTRAMUSCULAR; INTRAVENOUS EVERY 10 MIN PRN
Status: DISCONTINUED | OUTPATIENT
Start: 2023-09-15 | End: 2023-09-15

## 2023-09-15 RX ORDER — SODIUM CHLORIDE, SODIUM LACTATE, POTASSIUM CHLORIDE, CALCIUM CHLORIDE 600; 310; 30; 20 MG/100ML; MG/100ML; MG/100ML; MG/100ML
INJECTION, SOLUTION INTRAVENOUS CONTINUOUS
Status: DISCONTINUED | OUTPATIENT
Start: 2023-09-15 | End: 2023-09-15

## 2023-09-15 RX ORDER — ONDANSETRON 2 MG/ML
4 INJECTION INTRAMUSCULAR; INTRAVENOUS EVERY 6 HOURS PRN
Status: DISCONTINUED | OUTPATIENT
Start: 2023-09-15 | End: 2023-09-15

## 2023-09-15 RX ORDER — CEFAZOLIN SODIUM/WATER 2 G/20 ML
2 SYRINGE (ML) INTRAVENOUS ONCE
Status: DISCONTINUED | OUTPATIENT
Start: 2023-09-15 | End: 2023-09-15 | Stop reason: HOSPADM

## 2023-09-15 RX ORDER — MORPHINE SULFATE 4 MG/ML
4 INJECTION, SOLUTION INTRAMUSCULAR; INTRAVENOUS EVERY 2 HOUR PRN
OUTPATIENT
Start: 2023-09-15 | End: 2023-09-16

## 2023-09-15 RX ORDER — DEXAMETHASONE SODIUM PHOSPHATE 4 MG/ML
VIAL (ML) INJECTION AS NEEDED
Status: DISCONTINUED | OUTPATIENT
Start: 2023-09-15 | End: 2023-09-15 | Stop reason: SURG

## 2023-09-15 RX ORDER — HYDROMORPHONE HYDROCHLORIDE 1 MG/ML
0.4 INJECTION, SOLUTION INTRAMUSCULAR; INTRAVENOUS; SUBCUTANEOUS EVERY 5 MIN PRN
Status: DISCONTINUED | OUTPATIENT
Start: 2023-09-15 | End: 2023-09-15

## 2023-09-15 RX ORDER — HYDROMORPHONE HYDROCHLORIDE 1 MG/ML
0.2 INJECTION, SOLUTION INTRAMUSCULAR; INTRAVENOUS; SUBCUTANEOUS EVERY 5 MIN PRN
Status: DISCONTINUED | OUTPATIENT
Start: 2023-09-15 | End: 2023-09-15

## 2023-09-15 RX ORDER — ACETAMINOPHEN 500 MG
1000 TABLET ORAL EVERY 8 HOURS
OUTPATIENT
Start: 2023-09-15 | End: 2023-09-16

## 2023-09-15 RX ORDER — MORPHINE SULFATE 4 MG/ML
2 INJECTION, SOLUTION INTRAMUSCULAR; INTRAVENOUS EVERY 10 MIN PRN
Status: DISCONTINUED | OUTPATIENT
Start: 2023-09-15 | End: 2023-09-15

## 2023-09-15 RX ORDER — MORPHINE SULFATE 4 MG/ML
2 INJECTION, SOLUTION INTRAMUSCULAR; INTRAVENOUS EVERY 2 HOUR PRN
OUTPATIENT
Start: 2023-09-15 | End: 2023-09-16

## 2023-09-15 RX ORDER — OXYCODONE HYDROCHLORIDE 5 MG/1
5 TABLET ORAL EVERY 4 HOURS PRN
Status: DISCONTINUED | OUTPATIENT
Start: 2023-09-15 | End: 2023-09-15

## 2023-09-15 RX ORDER — ROPIVACAINE HYDROCHLORIDE 5 MG/ML
INJECTION, SOLUTION EPIDURAL; INFILTRATION; PERINEURAL
Status: COMPLETED | OUTPATIENT
Start: 2023-09-15 | End: 2023-09-15

## 2023-09-15 RX ORDER — METOCLOPRAMIDE HYDROCHLORIDE 5 MG/ML
10 INJECTION INTRAMUSCULAR; INTRAVENOUS EVERY 8 HOURS PRN
Status: DISCONTINUED | OUTPATIENT
Start: 2023-09-15 | End: 2023-09-15

## 2023-09-15 RX ORDER — DEXAMETHASONE SODIUM PHOSPHATE 10 MG/ML
INJECTION, SOLUTION INTRAMUSCULAR; INTRAVENOUS
Status: COMPLETED | OUTPATIENT
Start: 2023-09-15 | End: 2023-09-15

## 2023-09-15 RX ORDER — MORPHINE SULFATE 4 MG/ML
4 INJECTION, SOLUTION INTRAMUSCULAR; INTRAVENOUS EVERY 10 MIN PRN
Status: DISCONTINUED | OUTPATIENT
Start: 2023-09-15 | End: 2023-09-15

## 2023-09-15 RX ORDER — HYDROMORPHONE HYDROCHLORIDE 1 MG/ML
0.6 INJECTION, SOLUTION INTRAMUSCULAR; INTRAVENOUS; SUBCUTANEOUS EVERY 5 MIN PRN
Status: DISCONTINUED | OUTPATIENT
Start: 2023-09-15 | End: 2023-09-15

## 2023-09-15 RX ADMIN — DEXAMETHASONE SODIUM PHOSPHATE 10 MG: 10 INJECTION, SOLUTION INTRAMUSCULAR; INTRAVENOUS at 11:19:00

## 2023-09-15 RX ADMIN — ROPIVACAINE HYDROCHLORIDE 30 ML: 5 INJECTION, SOLUTION EPIDURAL; INFILTRATION; PERINEURAL at 11:19:00

## 2023-09-15 RX ADMIN — LIDOCAINE HYDROCHLORIDE 50 MG: 10 INJECTION, SOLUTION EPIDURAL; INFILTRATION; INTRACAUDAL; PERINEURAL at 12:04:00

## 2023-09-15 RX ADMIN — MIDAZOLAM HYDROCHLORIDE 2 MG: 1 INJECTION INTRAMUSCULAR; INTRAVENOUS at 11:19:00

## 2023-09-15 RX ADMIN — GLYCOPYRROLATE 0.2 MG: 0.2 INJECTION, SOLUTION INTRAMUSCULAR; INTRAVENOUS at 12:04:00

## 2023-09-15 RX ADMIN — DEXAMETHASONE SODIUM PHOSPHATE 4 MG: 4 MG/ML VIAL (ML) INJECTION at 12:06:00

## 2023-09-15 RX ADMIN — ONDANSETRON 4 MG: 2 INJECTION INTRAMUSCULAR; INTRAVENOUS at 12:06:00

## 2023-09-15 RX ADMIN — CEFAZOLIN SODIUM/WATER 2 G: 2 G/20 ML SYRINGE (ML) INTRAVENOUS at 12:01:00

## 2023-09-15 RX ADMIN — SODIUM CHLORIDE, SODIUM LACTATE, POTASSIUM CHLORIDE, CALCIUM CHLORIDE: 600; 310; 30; 20 INJECTION, SOLUTION INTRAVENOUS at 11:59:00

## 2023-09-15 RX ADMIN — LIDOCAINE HYDROCHLORIDE 5 ML: 10 INJECTION, SOLUTION INFILTRATION; PERINEURAL at 11:19:00

## 2023-09-15 RX ADMIN — TRANEXAMIC ACID 1000 MG: 10 INJECTION, SOLUTION INTRAVENOUS at 12:13:00

## 2023-09-15 RX ADMIN — TRANEXAMIC ACID 1000 MG: 10 INJECTION, SOLUTION INTRAVENOUS at 13:15:00

## 2023-09-15 NOTE — PHYSICAL THERAPY NOTE
PHYSICAL THERAPY EVALUATION - INPATIENT     [facility]  Evaluation Date: 9/15/2023  Type of Evaluation: Initial   Physician Order: PT Eval and Treat    Presenting Problem: Pt is s/p right medial unicompartmental knee arthroplasty . Pt is WBAT for right LE . Pt reports observed right thigh larger muscle type mass noted has been present for last 1-1/2-2 years prior to surgery not new . Pt post operatively with decrease light touch but intact to deeper touch on right sx site knee. Baseline facial assymetry observed from prior parotid gland removal.  Reason for Therapy: Mobility Dysfunction and Discharge Planning    PHYSICAL THERAPY ASSESSMENT   Orders received and chart reviewed. RN approved participation in physical therapy. Pt spouse is present . PPE worn by therapist: mask and gloves. Patient was not wearing a mask during session. Patient is a 69year old male admitted 9/15/2023 for Presenting Problem: Pt is s/p right medial unicompartmental knee arthroplasty . Pt is WBAT for right LE . Pt reports observed right thigh larger muscle type mass noted has been present for last 1-1/2-2 years prior to surgery not new . Pt post operatively with decrease light touch but intact to deeper touch on right sx site knee. Baseline facial assymetry observed from prior parotid gland removal..     Patient presented in bed with 0/10 pain. Pt reports increased pain to 1/10 with activity . Pt wearing clothes including jeans agreeable to participation. Pt reports feeling numb to light touch at sx site of right knee yet intact to deeper pressure touch and was able to localize therapy touch with eyes closed. Pt observed with right thigh larger proximal muscle type mass compared to left side . Per pt this is not new and has been present for last 1 1/2- 2years . This area in non tender to touch .         Education provided on right medial unicompartmental knee arthroplasty  exercise protocol, Physical therapy plan of care, physiological benefits of out of bed mobility, and fall risk prevention , fxn mobility training with RW , post op  therapy pt education , and DC Planning . Patient with good carryover. Spouse present for family training . Bed mobility:  CGA to SBA for supine to from sit   Transfers:  CGA to SBA with cues provided for proper sequencing   Gait Assistance: Contact guard assist (CGA progressed to SBA)  Distance (ft): 300 ft x 1  Assistive Device: Rolling walker  Pattern: R Decreased stance time;R Steppage;L Steppage (progressed to step through gait pattern)  Stair and stoop training : Up down 8 total steps with SPC and rail with CGA support declined need for further training. Up down curb step with RW CGA support. Therapy reinforced proper sequencing and safety on stairs / family training completed. Both pt and pt spouse decline any questions or concerns regarding stairs . Handout provided instruction initiated in B AP / B QS min cues / right HS in limited ROM / seated right LAQ and right knee flexion in limited ROM. Respect for pain and bandage encouraged. Reps for instruction purpose and respect for pain . Pt able to complete right SLR and denies any right knee instability in standing or during fxn mobility . No right knee buckling observed. Patient was left in bed at end of session with all needs in reach. Patient's current functional deficits include limitations in bed mobility , transfers . Ambulation and stairs . Pt presents with decreased right knee ROM post sx , decrease balance , increased fall risk and decrease activity tolerance. . The patient's Approx Degree of Impairment: 41.77% has been calculated based on documentation in the [facility] '6 clicks' Inpatient Basic Mobility Short Form. Research supports that patients with this level of impairment may benefit from Home with home health PT.  Therapy reinforced with pt and spouse recommendation for 24hr assist and support. Spouse confirms will provide recommended care. Pt was provided a RW . Pt with goal for DC to home setting today . Pt denies any questions or concerns at end of session. Therapy anticipates pt will DC to home setting with 24hr care of spouse and [facility] PT when medically stable. . RN aware of patient status post session. Patient will benefit from continued IP PT services to address these deficits in preparation for discharge. DISCHARGE RECOMMENDATIONS  PT Discharge Recommendations: 24 hour care/supervision;Home;Home with home health PT    PLAN  PT Treatment Plan: Bed mobility; Body mechanics; Endurance; Energy conservation;Patient education; Family education;Gait training;Range of motion;Strengthening;Stoop training;Stair training;Transfer training;Balance training  Rehab Potential : Good  Frequency (Obs): BID       PHYSICAL THERAPY MEDICAL/SOCIAL HISTORY     History related to current admission:      OPERATIVE REPORT     DATE OF SURGERY: 9/15/2023     PREOPERATIVE DIAGNOSIS:   Right knee medial compartment osteoarthritis     POST-OPERATIVE DIAGNOSIS:   Right knee medial compartment osteoarthritis     PROCEDURE:  Right medial unicondylar knee arthroplasty     [facility]: [facility]     SURGEON: Khushi Garvey MD     Anesthesia type: Spinal / adductor canal block  Estimated Blood Loss: 20cc     Specimen: Resected bone and cartilage      Findings:  Grade IV (full thickness) / Grade III  Cartilage loss over the medial femoral condylar and medial tibial plateau centrally. ACL intact and robust  degenerative medial meniscus tear  No cartilage changes over the patella and trochlea  No cartilage loss in the lateral compartment. Lateral meniscus intact     Drains: None     Complications: None immediately apparent     Implants:  Glenn Persona Partial Knee Size 6 femoral component  Glenn Persona Partial Knee Size F Tibial Component  9mm Vivacit-E VEHXPE poly articular surface     Indication:    This is a 69 year old male prior history of right medial compartment osteoarthritis, who presented with chronic knee pain refractory to non-operative treatment (including pain relieving medication, corticosteroid injections and physical therapy), and impairing activities of daily living. Radiographic evaluation demonstrated medial sided knee osteoarthritis with mild varus deformity. Surgical versus non-surgical options were discussed with the patient, and together we decided it is appropriate to proceed with a partial medial knee arthroplasty with the goals of pain relief and improvement in function. All risks and benefits of surgery including bleeding, infection, instability, william-prosthetic fracture, extensor mechanism injury, neurovascular injury, as well as medical and anesthesia-related complications were discussed with the patient / family, who elected to proceed with surgery. Post-operative Plan:     The patient will receive post-operative antibiotics as surgical prophylaxis. VTE prophylaxis will consist of early mobilization, mechanical compressive devices, and ASA 81 mg BID  The patient will be weight-bearing as tolerated and allowed to mobilize with physical therapy. The patient will be permitted range of motion as tolerated. The patient will follow up in clinic in 2 weeks for a wound check, and radiographic evaluation. Problem List  Active Problems:    * No active hospital problems.  *      Past Medical History  Past Medical History:   Diagnosis Date    Asthma     Calculus of kidney     Chronic kidney disease     Disorder of kidney and ureter     Disorder of thyroid     Elevated PSA     Essential hypertension     Exposure to medical diagnostic radiation     High blood pressure     High cholesterol     9/11/2023 patient denies any high cholesterol    History of general anesthesia complication     pt states he had sore throat for 3 weeks after intubation in past    HYPERTENSION IMPOTENCE     KIDNEY STONE     Osteoarthritis     Prostate cancer ([facility] [address] 2017    LtB-gl7, LtM-gl7    Renal disorder     Visual impairment     reading glasses       Past Surgical History  Past Surgical History:   Procedure Laterality Date    APPENDECTOMY      CHOLECYSTECTOMY      COLONOSCOPY          COLONOSCOPY       repeat 10 years      COLONOSCOPY,BIOPSY N/A 2015    Procedure: COLONOSCOPY, POSSIBLE BIOPSY, POSSIBLE POLYPECTOMY 05180;  Surgeon: Mecca Almaguer MD;  Location: [address] [place] [address] PAROTIDECTOMY  10/19/2021    Sialiothotomy    OTHER SURGICAL HISTORY  2015    PNBx Dr. Lamar Better  17    PNBx dr magdaleno    OTHER SURGICAL HISTORY  2018    Cystoscopy w/ Dr Cirilo Abebe HISTORY  10/23/2018    Cysto w/ RT URS stent placed adn ESWL w/ Dr Kayy Newman  Type of Home: House   Home Layout: Multi-level  Stairs to Enter : 0  Railing: No  Stairs to Bedroom: 10 (main level to bedroom    8 from entry way to main level)  Railing: Yes  Lives With: Spouse  Drives: Yes  Patient Owned Equipment:  (walking sticks)  Patient Regularly Uses: None (denies any prior hx of falls.)    Prior Level of [place]: Indep ADL and comm mobility with no AD     SUBJECTIVE  Goal for home today    Denies questions at end of session   denies any knee instability / pain well controlled     PHYSICAL THERAPY EXAMINATION     OBJECTIVE  Precautions: Limb alert - right  Fall Risk: Standard fall risk    WEIGHT BEARING RESTRICTION  Weight Bearing Restriction: R lower extremity        R Lower Extremity: Weight Bearing as Tolerated       PAIN ASSESSMENT  Ratin  Location: at rest no pain  post activity rated at 1/10  Management Techniques: Activity promotion; Body mechanics;Breathing techniques;Relaxation;Repositioning    COGNITION  Overall Cognitive Status:  WFL - within functional limits    RANGE OF MOTION AND STRENGTH ASSESSMENT  Upper extremity ROM and strength are within functional limits     Left  Lower extremity ROM is within functional limits     Left  Lower extremity strength is within functional limits     Decreased right knee ROM and strength post sx with good tolerance for post op exercises above --no formal measurements   Pt with intact and strong B ankle DF and PF. Pt denies any right knee instability. Pt returned demonstration of right LE SLR with good knee control     BALANCE  Static Sitting: Good  Dynamic Sitting: Fair +  Static Standing: Fair -  Dynamic Standing: Fair -    NEUROLOGICAL FINDINGS  Neurological Findings:  (numbness to light touch surgery site   intact motor fxn and deep touch   foot intact sensation  denies right knee instability during gait)                   ACTIVITY TOLERANCE  Pulse: (!) 49 (resting  post activity 66)        BP: 158/73 (resting post activity  162/90)             O2 WALK  Oxygen Therapy  SPO2% on Room Air at Rest: 98  SPO2% Ambulation on Room Air: 94    AM-PAC '6-Clicks' INPATIENT SHORT FORM - BASIC MOBILITY  How much difficulty does the patient currently have. .. Patient Difficulty: Turning over in bed (including adjusting bedclothes, sheets and blankets)?: None   Patient Difficulty: Sitting down on and standing up from a chair with arms (e.g., wheelchair, bedside commode, etc.): A Little   Patient Difficulty: Moving from lying on back to sitting on the side of the bed?: A Little   How much help from another person does the patient currently need. .. Help from Another: Moving to and from a bed to a chair (including a wheelchair)?: A Little   Help from Another: Need to walk in hospital room?: A Little   Help from Another: Climbing 3-5 steps with a railing?: A Little     AM-PAC Score:  Raw Score: 19   Approx Degree of Impairment: 41.77%   Standardized Score (AM-PAC Scale): 45.44   CMS Modifier (G-Code): CK    Patient End of Session: In bed;Needs met;Call light within reach;RN aware of session/findings; All patient questions and concerns addressed; Family present    CURRENT GOALS    Goals to be met by: 9/30/23  Patient Goal Patient's self-stated goal is: home    Goal #1 Patient is able to demonstrate supine - sit EOB @ level: supervision     Goal #1   Current Status    Goal #2 Patient is able to demonstrate transfers EOB to/from Chair/Wheelchair at assistance level: supervision with walker - rolling     Goal #2  Current Status    Goal #3 Patient is able to ambulate 300  feet with assist device: walker - rolling at assistance level: supervision   Goal #3   Current Status    Goal #4 Patient will negotiate 10 stairs/one curb w/ assistive device and supervision   Goal #4   Current Status    Goal #5 Patient to demonstrate independence with home activity/exercise instructions provided to patient in preparation for discharge.    Goal #5   Current Status    Goal #6    Goal #6  Current Status     Patient Evaluation Complexity Level:  History Low - no personal factors and/or co-morbidities   Examination of body systems Moderate - addressing a total of 3 or more elements   Clinical Presentation Low - Stable   Clinical Decision Making Low Complexity   PT eval and therapy activity 3 units

## 2023-09-15 NOTE — CM/SW NOTE
CM received secure chat from [facility] liaison, pt has been referred to [facility] prior to admission by Dr. Yvonne Adams. [facility] team to follow pt and send referral via [employer]. F2F entered. Claire Paniagua.  Aravind Pathak RN, BSN  Nurse   [phone]

## 2023-09-15 NOTE — OPERATIVE REPORT
OPERATIVE REPORT     DATE OF SURGERY: 9/15/2023     PREOPERATIVE DIAGNOSIS:   Right knee medial compartment osteoarthritis     POST-OPERATIVE DIAGNOSIS:   Right knee medial compartment osteoarthritis     PROCEDURE:  Right medial unicondylar knee arthroplasty     Location: [facility]     SURGEON: Kylie Ochoa MD     Anesthesia type: Spinal / adductor canal block  Estimated Blood Loss: 20cc     Specimen: Resected bone and cartilage      Findings:  Grade IV (full thickness) / Grade III  Cartilage loss over the medial femoral condylar and medial tibial plateau centrally. ACL intact and robust  degenerative medial meniscus tear  No cartilage changes over the patella and trochlea  No cartilage loss in the lateral compartment. Lateral meniscus intact     Drains: None     Complications: None immediately apparent     Implants:  Glenn Persona Partial Knee Size 6 femoral component  Glenn Persona Partial Knee Size F Tibial Component  9mm Vivacit-E VEHXPE poly articular surface     Indication: This is a 69year old male prior history of right medial compartment osteoarthritis, who presented with chronic knee pain refractory to non-operative treatment (including pain relieving medication, corticosteroid injections and physical therapy), and impairing activities of daily living. Radiographic evaluation demonstrated medial sided knee osteoarthritis with mild varus deformity. Surgical versus non-surgical options were discussed with the patient, and together we decided it is appropriate to proceed with a partial medial knee arthroplasty with the goals of pain relief and improvement in function. All risks and benefits of surgery including bleeding, infection, instability, william-prosthetic fracture, extensor mechanism injury, neurovascular injury, as well as medical and anesthesia-related complications were discussed with the patient / family, who elected to proceed with surgery.       Procedure in detail:     Following correct identification of the patient and the correct extremity, the patient was taken to the operating room and positioned supine on a regular table. Keisha-operative antibiotics and the above anesthesia were administered prior to incision per the standard protocol. A non-sterile padded tourniquet was placed on the thigh during the procedure and the extremity was prepped and draped in the usual sterile fashion. The extremity was exsanguinated using Esmarch bandage and the tourniquet was raised to 250mmHg. A midline incision was made medial to the tibial tubercle centered over patella taken down to the joint capsule of the knee. Minimal subfascial flaps were created. A medial parapatellar arthrotomy was performed. The deep MCL was subperiosteally elevated at the medial proximal tibial joint line for exposure. The fat pad was released off of the tibial plateau laterally. We examined the retropatellar cartilage which was intact and normal in appearance. The cartilage over the trochlea, lateral femoral condyle, and lateral tibial plateau was all intact and healthy. The patella was subluxed and the knee was flexed. The remnants of the anterior horn of the medial meniscus was removed. All distal protruding osteophytes were removed from the femur (medial and notch). No significant osteophytes over the tibia were noted. An external medullary cutting guide was attached using proximal tibial pins, and the proximal tibial surface was resected in an alignment roughly perpendicular to the mechanical axis with slight varus, and to a depth accommodating the smallest insert/tibial baseplate thickness. A spacer block and alignment jasson was attached to confirm alignment. The spacer block distal femur cutting guide was placed in extension, and pinned in place. The distal femur was resected. The resection depth and level was checked to be appropriate.      The femur was sized to a 6, and the guide was pinned, as lateral as possible, with no intercondylar overhang, and about 2mm recessed from the medial and anterior cortices. The two lug holes were drilled, and posterior and posterior chamfer cuts were made. The guide was removed. Spacer block was used to confirm the gaps once again. The tibia was sized to an H, and the trial was placed in appropriate position, resting on cortex medially. The trial was pinned, and the lug holes drilled. The trial femur was placed, and the smallest poly 8mm was inserted. A 2mm yolanda was used to assess the gaps, and both gaps felt appropriate, and not excessively tight. The trials were all removed, and the bony surfaces irrigated thoroughly and dried. 1 Pack of refobacin plain HV cement was mixed, and applied in pressure to the tibial surface. The tibial tray was placed and impacted into place. A hook instrument was used to remove any excess cement from posterior to the tibial tray. Cement was applied to the femoral surface, and pressurized. The femoral component was impacted into place and excess cement removed. The 8mm trial poly was placed, the knee was extended and the 2mm yolanda was placed in between the poly and femoral component to compress the surfaces for cement pressurization. After cement hardening, the trial was removed, loose cement in the back of the knee was removed, and the knee was irrigated and the final poly placed and locked into position. Periarticular CERTS injection was delivered to the soft tissues. The tourniquet was released, and hemostasis achieved. The knee was then copiously lavaged with saline irrigation. The deep capsule was closed with #1 Vicryl interrupted suture and a #2 quill barbed suture. The subcutaneous tissue was closed with 2-0 Vicryl absorbable suture and the skin was closed with 3-0 monocryl and dermabond. A sterile occlusive dressing was applied.       The patient tolerated the procedure well and taken to the recovery unit without immediate complications. Post-operative Plan:     The patient will receive post-operative antibiotics as surgical prophylaxis. VTE prophylaxis will consist of early mobilization, mechanical compressive devices, and ASA 81 mg BID  The patient will be weight-bearing as tolerated and allowed to mobilize with physical therapy. The patient will be permitted range of motion as tolerated. The patient will follow up in clinic in 2 weeks for a wound check, and radiographic evaluation.

## 2023-09-15 NOTE — DISCHARGE INSTRUCTIONS
You were set up preoperatively with [facility]. They can be reached by phone at [phone]. The fax number for your reference is [phone]. A representative from the home health agency will contact you or your family to schedule your first visit. DISCHARGE INSTRUCTIONS:  PLACE ICE TO SURGICAL AREA 20-30 MINUTES ON/ 20-30 MINUTES OFF. THIS IS TO HELP WITH PAIN & SWELLING. TAKE YOUR MEDICATIONS BELOW AS PRESCRIBED. THESE HAVE BEEN SENT TO YOUR PHARMACY. IT IS OK TO BEAR WEIGHT AS TOLERATED ON THE OPERATIVE EXTREMITY. MEDICATIONS:    POST OP MEDICATION REGIMEN              MULTI-MODAL   PAIN REGIMEN    *Take 1st dose upon arrival home. DRUG   FOR   FREQUENCY & DURATION   QTY   NOTES     WEANING  TIPS       Tylenol 500mg     Mild pain   Take 2 tabs every 6 hours     126   Can purchase over-the-counter    Stop using medication if no longer having pain. Tramadol 50mg     Moderate pain   Take 1-2 tabs every 6 hours only as needed   30 May prescribe a refill, but ideally, this should be tapered off after surgery Stop using this medication 2nd   As pain decreases over time, increase the interval between doses (6 hours to 8, then 12, then 24) to taper off the medication. Celebrex 200 mg     Inflammation   Take 1 tab daily for 30 days     30   May refill if needed beyond 30 days   Recommend completing the 30 day supply. BREAKTHROUGH PAIN         Oxycodone 5mg       Severe pain     Take 1-2 tabs every 4-6 hours only as needed for severe pain       40   Take at least 1 hr apart from Tramadol. Ideally, no refill. The goal is to taper off this medication as soon as possible, as it can be addictive and have side effects. Stop using this medication 1st if no longer having severe pain.      BLOOD THINNER  *1st dose after surgery at 6pm*   Aspirin 81mg   Blood clot prevention   Take 1 tab twice daily for 42 days     84     No refills   Complete the entire course of your blood thinner. STOOL SOFTENER     Colace 100 mg   Constipation   Take 1 tab twice daily  while on opioids     60 Refer to discharge instructions if constipation persists even after taking this medication   Stop taking if having diarrhea or loose stools. DRESSING:    YOU MAY REMOVE ACE BANDAGE AND COTTON WRAP 2  DAYS AFTER SURGERY    YOU HAVE A SPECIAL DRESSING CALLED AN AQUACEL DRESSING OVER YOUR INCISION. THE DRESSING STAYS FOR 12 DAYS FROM SURGERY. YOU MAY SHOWER AS SOON AS YOU RETURN HOME WITH THE AQUACEL DRESSING INTACT OVER YOUR INCISION AREA. IF THE DRESSING LEAKS OR COMES LOOSE BEFORE THE 7 DAY PERIOD CONTACT YOUR DOCTOR / SURGEON. AFTER   12 DAYS THE DRESSING IS REMOVED BY PULLING ON THE EDGE OF THE DRESSING AND GENTLY STRETCHING IT, THEN PEEL IT OFF SLOWLY LIKE A BANDAID. IF YOU HAVE ANY QUESTIONS OR CONCERNS ABOUT THE DRESSING CONTACT YOUR DOCTOR. IF DRAINAGE IS PRESENT AT ANYTIME FROM YOUR DRESSING OR FROM YOUR INCISION CALL YOUR DOCTOR / SURGEON AS SOON AS POSSIBLE. REASONS TO CALL YOUR DOCTOR:  TEMPERATURE .0 OR MORE  PERSISTANT NAUSEA OR VOMITTING - ESPECIALLY IF YOU ARE UNABLE TO EAT OR DRINK. INCREASED LEVEL OF PAIN OR PAIN WHICH IS NOT CONTROLLED BY YOUR PAIN MEDICINE. PERSISTENT DRAINAGE AT ANYTIME FROM YOUR SURGICAL AREA / INCISION. PAIN, TENDERNESS OR SUDDEN SWELLING IN YOUR CALF REGION OF THE LOWER EXTREMITIES - THIS IS A POSSIBLE SIGN OF A BLOOD CLOT. ANY CHANGES IN SENSATION IN YOUR BODY IN THE AREA OF YOUR SURGERY = NUMBNESS OR TINGLING. ANY CHANGES IN CIRCULATION IN YOUR BODY IN THE AREA OF YOUR SURGERY = CHANGES  IN COLOR EITHER DARKER OR VERY PALE; OR  IF AREA FEELS COLD TO THE TOUCH AS COMPARED TO OTHER AREAS. ANY CHANGES IN FUNCTION IN YOUR BODY IN THE AREA OF YOUR SURGERY = CHANGE IN MOVEMENT - UNABLE TO MOVE OR WIGGLE FINGERS, TOES OR FOOT OR ANY OTHER CHANGES IN NORMAL BODY FUNCTIONING.   FOR ANY OF THE ABOVE OR ANY OTHER QUESTIONS OR CONCERNS CALL YOUR DOCTOR/SURGEON AS SOON AS POSSIBLE.

## 2024-06-19 ENCOUNTER — HOSPITAL ENCOUNTER (OUTPATIENT)
Age: 79
Discharge: HOME | End: 2024-06-19

## 2024-06-19 VITALS
TEMPERATURE: 98 F | DIASTOLIC BLOOD PRESSURE: 84 MMHG | OXYGEN SATURATION: 99 % | HEART RATE: 68 BPM | RESPIRATION RATE: 20 BRPM | SYSTOLIC BLOOD PRESSURE: 146 MMHG

## 2024-06-19 DIAGNOSIS — S80.12XA HEMATOMA OF LEFT LOWER EXTREMITY, INITIAL ENCOUNTER: Primary | ICD-10-CM

## 2024-06-19 PROCEDURE — 99202 OFFICE O/P NEW SF 15 MIN: CPT

## 2024-06-19 PROCEDURE — 99212 OFFICE O/P EST SF 10 MIN: CPT

## 2024-06-20 NOTE — ED PROVIDER NOTES
Patient Seen in: [facility]      History     Chief Complaint   Patient presents with    Edema     Stated Complaint: left leg outer side calf swelling with pain    Subjective:   HPI    Patient is a 79-year-old male presenting to immediate care for evaluation of acute left lateral lower extremity shin pain and swelling that started this afternoon.  Patient was sitting at a restaurant in chair hightop.  States was pressing his leg against wooden stool.  Then noted associated pain and swelling and bruising on lateral aspect of leg.  Tender to palpation.  Relief with rest.  Is not taking thing for pain.  Denies any numbness weakness paresthesias.  No redness or warmth.  No ulcerations or lesions or sores.  Denies prior episodes.  No fevers.  No chest pain or shortness of breath.    Objective:   No pertinent past medical history.            No pertinent past surgical history.              No pertinent social history.            Review of Systems   Constitutional:  Negative for fever.   Musculoskeletal:         Left lower leg pain and swelling   Skin:         Leg bruising   Neurological:  Negative for weakness and numbness.   Psychiatric/Behavioral:  Negative for confusion.    All other systems reviewed and are negative.      Positive for stated complaint: left leg outer side calf swelling with pain  Other systems are as noted in HPI.  Constitutional and vital signs reviewed.      All other systems reviewed and negative except as noted above.    Physical Exam     ED Triage Vitals [06/19/24 1912]   /84   Pulse 68   Resp 20   Temp 98 °F (36.7 °C)   Temp src Temporal   SpO2 99 %   O2 Device None (Room air)       Current Vitals:   Vital Signs  BP: 146/84  Pulse: 68  Resp: 20  Temp: 98 °F (36.7 °C)  Temp src: Temporal    Oxygen Therapy  SpO2: 99 %  O2 Device: None (Room air)            Physical Exam  Vitals and nursing note reviewed.   Constitutional:       General: He is not in acute distress.      Appearance: Normal appearance. He is not ill-appearing, toxic-appearing or diaphoretic.   HENT:      Head: Normocephalic and atraumatic.      Mouth/Throat:      Mouth: Mucous membranes are moist.   Eyes:      General: No scleral icterus.     Conjunctiva/sclera: Conjunctivae normal.   Cardiovascular:      Pulses: Normal pulses.   Pulmonary:      Effort: No respiratory distress.   Musculoskeletal:         General: Swelling and tenderness present. Normal range of motion.      Cervical back: No rigidity.   Skin:     Findings: Bruising present.   Neurological:      General: No focal deficit present.      Mental Status: He is alert and oriented to person, place, and time.      Motor: No weakness.      Coordination: Coordination normal.      Gait: Gait normal.   Psychiatric:         Mood and Affect: Mood normal.         Behavior: Behavior normal.           ED Course   Labs Reviewed - No data to display           MDM     Patient is a 79-year-old male, presenting to immediate care for evaluation of acute onset of localized pain, swelling, and bruising on lateral aspect of left lower anterior shin.  Onset this evening.  Patient was pressing leg against wooden stool.  Exam notable for hematoma left lower extremity.  Compartments are soft.  Neurovascular intact.  Normal range of motion of extremities.  Normal ambulation.  Negative Homans' sign.  No suspicion for deep venous thrombosis.  Do not have ultrasound imaging at our immediate care clinic at this time.  Do have low suspicion.  Exam and history consistent with hematoma secondary to placing pressure on affected area.  Will treat outpatient supportively.  Ice therapy.  Pain management.  Leg elevation.  RICE therapy.  PCP follow-up.  ED return precautions including worsening pain, swelling, redness, bruising, rashes, fevers, etc.                                 Medical Decision Making      Disposition and Plan     Clinical Impression:  1. Hematoma of left lower extremity,  initial encounter         Disposition:  Discharge  6/19/2024  7:26 pm    Follow-up:  Josafat Kapadia,   [address] [facility] [address]  [phone]          [facility]  [address] [facility] [address] [phone]    Emergency Department, As needed, If symptoms persistent or worsen          Medications Prescribed:  Current Discharge Medication List                                          Sski Pregnancy And Lactation Text: This medication is Pregnancy Category D and isn't considered safe during pregnancy. It is excreted in breast milk.

## 2024-06-20 NOTE — ED INITIAL ASSESSMENT (HPI)
Patient presents with sudden left lateral leg swelling below the knee  States it happened while at a restaurant  Denies injury, no known animal bites  Bruising also noted

## 2024-06-21 ENCOUNTER — APPOINTMENT (OUTPATIENT)
Dept: ULTRASOUND IMAGING | Age: 79
End: 2024-06-21
Attending: PHYSICIAN ASSISTANT

## 2024-06-21 ENCOUNTER — HOSPITAL ENCOUNTER (OUTPATIENT)
Age: 79
Discharge: HOME | End: 2024-06-21

## 2024-06-21 VITALS
HEART RATE: 56 BPM | OXYGEN SATURATION: 97 % | RESPIRATION RATE: 16 BRPM | DIASTOLIC BLOOD PRESSURE: 66 MMHG | TEMPERATURE: 98 F | SYSTOLIC BLOOD PRESSURE: 124 MMHG

## 2024-06-21 DIAGNOSIS — S80.12XD HEMATOMA OF LEG, LEFT, SUBSEQUENT ENCOUNTER: Primary | ICD-10-CM

## 2024-06-21 PROCEDURE — 99213 OFFICE O/P EST LOW 20 MIN: CPT

## 2024-06-21 PROCEDURE — 99214 OFFICE O/P EST MOD 30 MIN: CPT

## 2024-06-21 PROCEDURE — 93971 EXTREMITY STUDY: CPT | Performed by: PHYSICIAN ASSISTANT

## 2024-06-21 NOTE — ED INITIAL ASSESSMENT (HPI)
Patient arrives ambulatory with c/o 2 days of left leg hematoma. Reports being assessed on 6/19 for this issue. Reports improving swelling and pain. Patient would like an US, reports that was discussed last time he was here, but US had already left. Denies injury/trauma, but does report he had his left left wrapped abourd high top chair.

## 2024-06-21 NOTE — ED PROVIDER NOTES
Patient Seen in: [facility]      History     Chief Complaint   Patient presents with    Leg Pain     Stated Complaint: f/u leg    Subjective:   HPI    79-year-old male presenting from home for evaluation of swelling to the left lower extremity.  Patient developed swelling, bruising and discomfort to the left lower extremity 2 days ago after he was resting his leg on a hard wooden stool.  He was seen in our facility the day of injury.  An US was discussed however it was to late in the day.  Patient notes the swelling has improved somewhat and there has been NO interval increase in pain.  No blood thinners.     Objective:   No pertinent past medical history.            No pertinent past surgical history.              No pertinent social history.            Review of Systems    Positive for stated complaint: f/u leg  Other systems are as noted in HPI.  Constitutional and vital signs reviewed.      All other systems reviewed and negative except as noted above.    Physical Exam     ED Triage Vitals [06/21/24 1120]   /66   Pulse 56   Resp 16   Temp 98.2 °F (36.8 °C)   Temp src Temporal   SpO2 97 %   O2 Device None (Room air)       Current Vitals:   Vital Signs  BP: 124/66  Pulse: 56  Resp: 16  Temp: 98.2 °F (36.8 °C)  Temp src: Temporal    Oxygen Therapy  SpO2: 97 %  O2 Device: None (Room air)            Physical Exam  Vitals and nursing note reviewed.   Constitutional:       General: He is not in acute distress.  HENT:      Head: Normocephalic and atraumatic.      Right Ear: External ear normal.      Left Ear: External ear normal.      Nose: Nose normal.      Mouth/Throat:      Mouth: Mucous membranes are moist.   Eyes:      Extraocular Movements: Extraocular movements intact.      Pupils: Pupils are equal, round, and reactive to light.   Cardiovascular:      Rate and Rhythm: Normal rate.   Pulmonary:      Effort: Pulmonary effort is normal.   Abdominal:      General: Abdomen is flat.    Musculoskeletal:         General: Normal range of motion.      Cervical back: Normal range of motion.      Left lower leg: Swelling present.        Legs:       Comments: There is a large area of edema, ecchymosis overlying the lateral aspect of the left lower extremity.  Mild tenderness to palpation  2+ DP pulse   Skin:     General: Skin is warm.   Neurological:      General: No focal deficit present.      Mental Status: He is alert and oriented to person, place, and time.   Psychiatric:         Mood and Affect: Mood normal.         Behavior: Behavior normal.               ED Course   Labs Reviewed - No data to display       79-year-old male presenting with complaint of swelling, bruising to the left lower extremity patient neurovascularly intact. Pain has improved in the last 48 hrs and no interval increase in the swelling.  Ddx-soft tissue hematoma, contusion, DVT, dependent edema    US negative for DVT. Complex appearing collection correlating to the ecchymotic area of the lower extremity. Consistent with hematoma.   Ace wrap placed for compression. Rec'd pcp follow up next week to ensure symptoms resolving. ER return precautions advised.             MDM                                         Medical Decision Making      Disposition and Plan     Clinical Impression:  1. Hematoma of leg, left, subsequent encounter         Disposition:  Discharge  6/21/2024  1:03 pm    Follow-up:  Josafat Kapadia DO  [address] [facility] [address]  [phone]                Medications Prescribed:  Current Discharge Medication List

## (undated) DEVICE — ELECTRODE 8227410 PAIRED 2 CH SET ROHS

## (undated) DEVICE — T5 HOOD WITH PEEL AWAY FACE SHIELD

## (undated) DEVICE — SOL NACL IRRIG 0.9% 1000ML BTL

## (undated) DEVICE — SPONGE RAYTEC 4X4 RF DETECT

## (undated) DEVICE — SOL  .9 1000ML BTL

## (undated) DEVICE — 2T11 #2 PDO 36 X 36: Brand: 2T11 #2 PDO 36 X 36

## (undated) DEVICE — DRAIN RESERVOIR RELIAVAC 100CC

## (undated) DEVICE — 3M™ TEGADERM™ +PAD FILM DRESSING WITH NON-ADHERENT PAD, 3587, 3-1/2 IN X 4-1/8 IN (9 CM X 10.5 CM), 25/CAR, 4 CAR/CS: Brand: 3M™ TEGADERM™

## (undated) DEVICE — TOTAL KNEE: Brand: MEDLINE INDUSTRIES, INC.

## (undated) DEVICE — PROXIMATE SKIN STAPLERS (35 WIDE) CONTAINS 35 STAINLESS STEEL STAPLES (FIXED HEAD): Brand: PROXIMATE

## (undated) DEVICE — HEAD AND NECK CDS-LF: Brand: MEDLINE INDUSTRIES, INC.

## (undated) DEVICE — DRAIN SILICONE RND 3/16

## (undated) DEVICE — LIGHT HANDLE

## (undated) DEVICE — Device: Brand: STABLECUT®

## (undated) DEVICE — DRESSING AQUACEL AG SP 3.5X10

## (undated) DEVICE — SCD SLEEVE KNEE HI BLEND

## (undated) DEVICE — STERILE POLYISOPRENE POWDER-FREE SURGICAL GLOVES: Brand: PROTEXIS

## (undated) DEVICE — SYRINGE 35ML LL TIP

## (undated) DEVICE — CASED DISP BIPOLAR CORD

## (undated) DEVICE — NEEDLE HPO 18GA 1.5IN ECLPS

## (undated) DEVICE — 3M™ STERI-STRIP™ REINFORCED ADHESIVE SKIN CLOSURES, R1547, 1/2 IN X 4 IN (12 MM X 100 MM), 6 STRIPS/ENVELOPE: Brand: 3M™ STERI-STRIP™

## (undated) DEVICE — CAUTERY BLADE 2IN INS E1455

## (undated) DEVICE — HARMONIC FOCUS SHEARS 9CM LENGTH + ADAPTIVE TISSUE TECHNOLOGY FOR USE WITH BLUE HAND PIECE ONLY: Brand: HARMONIC FOCUS

## (undated) DEVICE — SUTURE PLAIN GUT 6-0 G-1

## (undated) DEVICE — DRAPE SHEET LARGE 76X55

## (undated) DEVICE — 450 ML BOTTLE OF 0.05% CHLORHEXIDINE GLUCONATE IN 99.95% STERILE WATER FOR IRRIGATION, USP AND APPLICATOR.: Brand: IRRISEPT ANTIMICROBIAL WOUND LAVAGE

## (undated) DEVICE — Device

## (undated) DEVICE — ARISTA 1 GRAM

## (undated) DEVICE — APPLICATOR CHLORAPREP 26ML

## (undated) DEVICE — 60 ML SYRINGE CATHETER TIP: Brand: MONOJECT

## (undated) DEVICE — HANDPIECE SET WITH HIGH FLOW TIP AND SUCTION TUBE: Brand: INTERPULSE

## (undated) DEVICE — DRESSING BIOPATCH 1X4 CNTR

## (undated) DEVICE — COVER PROBE GELFREE LG SFRSNC

## (undated) DEVICE — SUTURE PLAIN GUT 5-0 PC-1

## (undated) DEVICE — 1010 S-DRAPE TOWEL DRAPE 10/BX: Brand: STERI-DRAPE™

## (undated) DEVICE — SOLUTION  .9 3000ML

## (undated) DEVICE — RETRACT LONE STAR STAYS DULL

## (undated) DEVICE — PROBE 8225101 5PK STD PRASS FL TIP ROHS

## (undated) DEVICE — MEDI-VAC SUCTION FINE CAPACITY: Brand: CARDINAL HEALTH

## (undated) DEVICE — SUTURE SILK 3-0 X-1

## (undated) DEVICE — SUTURE SILK 3-0 SH

## (undated) DEVICE — SUTURE VICRYL 3-0 SH

## (undated) DEVICE — PREMIUM WET SKIN PREP TRAY: Brand: MEDLINE INDUSTRIES, INC.

## (undated) DEVICE — BOWL CEMENT MIX QUICK-VAC

## (undated) DEVICE — MARKER SKIN PREP RESIST STRL

## (undated) DEVICE — SUT VICRYL 2-0 CT-2 J269H

## (undated) DEVICE — HOOD: Brand: FLYTE

## (undated) DEVICE — SUTURE SILK 3-0

## (undated) DEVICE — BNDG COHESIVE W4INXL5YD TAN E

## (undated) DEVICE — GAMMEX® NON-LATEX PI ORTHO SIZE 8.5, STERILE POLYISOPRENE POWDER-FREE SURGICAL GLOVE: Brand: GAMMEX

## (undated) DEVICE — SPONGE: SPECIALTY PEANUT XR 100/CS: Brand: MEDICAL ACTION INDUSTRIES

## (undated) DEVICE — GAMMEX® PI HYBRID SIZE 8, STERILE POWDER-FREE SURGICAL GLOVE, POLYISOPRENE AND NEOPRENE BLEND: Brand: GAMMEX

## (undated) DEVICE — DRAIN RELIAVAC 100CC

## (undated) NOTE — LETTER
OUTSIDE TESTING RESULT REQUEST     IMPORTANT: FOR YOUR IMMEDIATE ATTENTION  Please FAX all test results listed below to: [phone]     Testing already done on or about: 8/5/2021         Patient Name: Marcos Carrizales  Surgery Date: 8/31/2021  CSN: [id number]